# Patient Record
Sex: FEMALE | Race: OTHER | Employment: UNEMPLOYED | URBAN - METROPOLITAN AREA
[De-identification: names, ages, dates, MRNs, and addresses within clinical notes are randomized per-mention and may not be internally consistent; named-entity substitution may affect disease eponyms.]

---

## 2019-04-30 ENCOUNTER — OFFICE VISIT (OUTPATIENT)
Dept: FAMILY MEDICINE CLINIC | Age: 39
End: 2019-04-30

## 2019-04-30 VITALS
TEMPERATURE: 99.7 F | SYSTOLIC BLOOD PRESSURE: 106 MMHG | RESPIRATION RATE: 17 BRPM | HEART RATE: 74 BPM | DIASTOLIC BLOOD PRESSURE: 54 MMHG | WEIGHT: 138 LBS | OXYGEN SATURATION: 99 %

## 2019-04-30 DIAGNOSIS — Z00.00 ANNUAL PHYSICAL EXAM: Primary | ICD-10-CM

## 2019-04-30 DIAGNOSIS — Z00.00 ANNUAL PHYSICAL EXAM: ICD-10-CM

## 2019-04-30 DIAGNOSIS — M25.50 ARTHRALGIA, UNSPECIFIED JOINT: ICD-10-CM

## 2019-04-30 DIAGNOSIS — F32.A DEPRESSION, UNSPECIFIED DEPRESSION TYPE: ICD-10-CM

## 2019-04-30 DIAGNOSIS — R53.83 FATIGUE, UNSPECIFIED TYPE: ICD-10-CM

## 2019-04-30 DIAGNOSIS — R63.5 WEIGHT GAIN: ICD-10-CM

## 2019-04-30 NOTE — PROGRESS NOTES
Crysatl Cantu is a 45 y.o. female presents to office for establish care and headache. Back pain. Tired and lethargic. Depression Medication list has been reviewed with Crystal Cantu and updated as of today's date Health Maintenance items with a due date reviewed with patient: 
Health Maintenance Due Topic Date Due  
 DTaP/Tdap/Td series (1 - Tdap) 11/28/2001  PAP AKA CERVICAL CYTOLOGY  11/28/2001

## 2019-04-30 NOTE — PROGRESS NOTES
Teresa Coyne Chief Complaint Patient presents with [facility]  Back Pain  Lethargy  Depression Vitals:  
 19 1424 BP: 106/54 Pulse: 74 Resp: 17 Temp: 99.7 °F (37.6 °C) TempSrc: Oral  
SpO2: 99% Weight: 138 lb (62.6 kg) PainSc:   0 - No pain HPI:she is here for [facility] and she has few complains , multiple joint pain  and stifnes for many month ,  elbow hands and shoulders there is no significant swelling, Planing of fatigue tired,decreases energy as well as weight gain. She also has depressed mood and feels stressed out. Not suicidal or homicidal. 
 
 
History reviewed. No pertinent past medical history. Past Surgical History:  
Procedure Laterality Date  HX  SECTION    
 HX LIPOSUCTION    
 HX TUBAL LIGATION Social History Tobacco Use  Smoking status: Never Smoker  Smokeless tobacco: Never Used Substance Use Topics  Alcohol use: Never Frequency: Never History reviewed. No pertinent family history. Review of Systems Constitutional: Negative for chills, fever, malaise/fatigue and weight loss. HENT: Negative for congestion, ear discharge, ear pain, hearing loss, nosebleeds, sinus pain and sore throat. Eyes: Negative for blurred vision, double vision and discharge. Respiratory: Negative for cough, hemoptysis, sputum production and shortness of breath. Cardiovascular: Negative for chest pain, palpitations, claudication and leg swelling. Gastrointestinal: Negative for abdominal pain, constipation, diarrhea, nausea and vomiting. Genitourinary: Negative for dysuria, frequency and urgency. Musculoskeletal: Positive for back pain, joint pain and neck pain. Negative for myalgias. Skin: Negative for itching and rash. Neurological: Positive for tingling and headaches. Negative for dizziness, sensory change, speech change, focal weakness and weakness. Psychiatric/Behavioral: Positive for depression. Negative for hallucinations, substance abuse and suicidal ideas. The patient is nervous/anxious. The patient does not have insomnia. Physical Exam  
Constitutional: She is oriented to person, place, and time. She appears well-developed and well-nourished. No distress. HENT:  
Head: Normocephalic and atraumatic. Mouth/Throat: Oropharynx is clear and moist. No oropharyngeal exudate. Eyes: Pupils are equal, round, and reactive to light. Conjunctivae are normal. Right eye exhibits no discharge. Left eye exhibits no discharge. No scleral icterus. Neck: Normal range of motion. Neck supple. No thyromegaly present. Cardiovascular: Normal rate, regular rhythm and normal heart sounds. No murmur heard. Pulmonary/Chest: Effort normal and breath sounds normal. No respiratory distress. She has no wheezes. She has no rales. She exhibits no tenderness. Abdominal: Soft. Bowel sounds are normal. She exhibits no distension. There is no tenderness. There is no rebound. Musculoskeletal: Normal range of motion. She exhibits no edema, tenderness or deformity. Lymphadenopathy:  
  She has no cervical adenopathy. Neurological: She is alert and oriented to person, place, and time. No cranial nerve deficit. Coordination normal.  
Skin: Skin is warm and dry. No rash noted. She is not diaphoretic. No erythema. No pallor. Psychiatric: She has a normal mood and affect. Her behavior is normal. Judgment and thought content normal.  
Nursing note and vitals reviewed. Assessment and plan  
 
Plan of care has been discussed with the patient, he agrees to the plan and verbalized understanding. All his questions were answered More than 50% of the time spent in this visit was counseling the patient about  illness and treatment options 1. Arthralgia, unspecified joint 
 
- RHEUMATOID FACTOR, QL; Future - CRP, HIGH SENSITIVITY; Future - CBC WITH AUTOMATED DIFF; Future - VITAMIN D, 25 HYDROXY; Future This is not a 2. Fatigue, unspecified type 
 
- THYROID CASCADE PROFILE; Future 3. Depression, unspecified depression type Would like to wait for the results to come back and then consider possible treatment 
 
 
- THYROID CASCADE PROFILE; Future 4. Weight gain 
 
- THYROID CASCADE PROFILE; Future There are no active problems to display for this patient. No results found for this or any previous visit. No results found for any previous visit. Follow-up and Dispositions · Return in about 2 weeks (around 5/14/2019).

## 2019-05-02 NOTE — PROGRESS NOTES
Mario Xavier Chief Complaint Patient presents with [employer] [facility]  Back Pain  Lethargy  Depression Vitals:  
 19 1424 BP: 106/54 Pulse: 74 Resp: 17 Temp: 99.7 °F (37.6 °C) TempSrc: Oral  
SpO2: 99% Weight: 138 lb (62.6 kg) PainSc:   0 - No pain HPI: she is here for annual physical exam and blood work She is healthy not on any medication Does not smoke does not drink alcohol. She has complained that has been addressed in a different note History reviewed. No pertinent past medical history. Past Surgical History:  
Procedure Laterality Date  HX  SECTION    
 HX LIPOSUCTION    
 HX TUBAL LIGATION Social History Tobacco Use  Smoking status: Never Smoker  Smokeless tobacco: Never Used Substance Use Topics  Alcohol use: Never Frequency: Never History reviewed. No pertinent family history. Review of Systems Constitutional: Negative for chills, fever, malaise/fatigue and weight loss. HENT: Negative for congestion, ear discharge, ear pain, hearing loss and nosebleeds. Eyes: Negative for blurred vision, double vision and discharge. Respiratory: Negative for cough, hemoptysis, sputum production, shortness of breath and wheezing. Cardiovascular: Negative for chest pain, palpitations, claudication and leg swelling. Gastrointestinal: Negative for abdominal pain, constipation, diarrhea, nausea and vomiting. Genitourinary: Negative for dysuria, frequency and urgency. Musculoskeletal: Positive for joint pain. Negative for myalgias. Skin: Negative for itching and rash. Neurological: Negative for dizziness, tingling, sensory change, speech change, focal weakness, weakness and headaches. Psychiatric/Behavioral: Positive for depression and memory loss. Negative for hallucinations, substance abuse and suicidal ideas. The patient is nervous/anxious. The patient does not have insomnia. Physical Exam  
Constitutional: She is oriented to person, place, and time. She appears well-developed and well-nourished. No distress. HENT:  
Head: Normocephalic and atraumatic. Mouth/Throat: Oropharynx is clear and moist. No oropharyngeal exudate. Eyes: Pupils are equal, round, and reactive to light. Conjunctivae are normal. Right eye exhibits no discharge. Left eye exhibits no discharge. No scleral icterus. Neck: Normal range of motion. Neck supple. No thyromegaly present. Cardiovascular: Normal rate, regular rhythm and normal heart sounds. No murmur heard. Pulmonary/Chest: Effort normal and breath sounds normal. No respiratory distress. She has no wheezes. She has no rales. She exhibits no tenderness. Abdominal: Soft. She exhibits no distension. There is no tenderness. There is no rebound. There is a scar flap resection and umbilicus looks like created postsurgery Musculoskeletal: Normal range of motion. She exhibits tenderness. She exhibits no edema or deformity. Lymphadenopathy:  
  She has no cervical adenopathy. Neurological: She is alert and oriented to person, place, and time. No cranial nerve deficit. Coordination normal.  
Skin: Skin is warm and dry. No rash noted. She is not diaphoretic. No erythema. No pallor. Psychiatric: She has a normal mood and affect. Her behavior is normal. Judgment and thought content normal.  
Nursing note and vitals reviewed. Assessment and plan  
 
Plan of care has been discussed with the patient, he agrees to the plan and verbalized understanding. All his questions were answered More than 50% of the time spent in this visit was counseling the patient about  illness and treatment options Annual physical exam 
 
- LIPID PANEL; Future - METABOLIC PANEL, COMPREHENSIVE; Future - URINALYSIS W/MICROSCOPIC; Future There are no active problems to display for this patient. No results found for this or any previous visit. No results found for any previous visit. Follow-up and Dispositions · Return in about 2 weeks (around 5/14/2019).

## 2019-05-21 ENCOUNTER — OFFICE VISIT (OUTPATIENT)
Dept: FAMILY MEDICINE CLINIC | Age: 39
End: 2019-05-21

## 2019-05-21 VITALS
HEART RATE: 89 BPM | RESPIRATION RATE: 17 BRPM | HEIGHT: 59 IN | DIASTOLIC BLOOD PRESSURE: 75 MMHG | WEIGHT: 138 LBS | TEMPERATURE: 98 F | SYSTOLIC BLOOD PRESSURE: 125 MMHG | OXYGEN SATURATION: 99 % | BODY MASS INDEX: 27.82 KG/M2

## 2019-05-21 DIAGNOSIS — Z11.3 SCREEN FOR STD (SEXUALLY TRANSMITTED DISEASE): ICD-10-CM

## 2019-05-21 DIAGNOSIS — Z12.4 SCREENING FOR CERVICAL CANCER: Primary | ICD-10-CM

## 2019-05-21 NOTE — PROGRESS NOTES
Magi Argueta     Chief Complaint   Patient presents with    Well Woman     Vitals:    19 1550   BP: 125/75   Pulse: 89   Resp: 17   Temp: 98 °F (36.7 °C)   TempSrc: Oral   SpO2: 99%   Weight: 138 lb (62.6 kg)   Height: 4' 11\" (1.499 m)   PainSc:   0 - No pain         HPI: Patient is here to get pelvic examination and Pap smear. And breast exam    She has no acute complaints today she is scheduled to get carpal tunnel surgery, next week they    Blood work was all the last visit the patient will get it done today    No past medical history on file. Past Surgical History:   Procedure Laterality Date    HX  SECTION      HX LIPOSUCTION      HX TUBAL LIGATION       Social History     Tobacco Use    Smoking status: Never Smoker    Smokeless tobacco: Never Used   Substance Use Topics    Alcohol use: Never     Frequency: Never       No family history on file. Review of Systems   Constitutional: Negative for chills, fever, malaise/fatigue and weight loss. HENT: Negative for congestion, ear discharge, ear pain, hearing loss and nosebleeds. Eyes: Negative for blurred vision, double vision and discharge. Respiratory: Negative for cough. Cardiovascular: Negative for chest pain, palpitations, claudication and leg swelling. Gastrointestinal: Negative for abdominal pain, constipation, diarrhea, nausea and vomiting. Genitourinary: Negative for dysuria, frequency and urgency. Musculoskeletal: Positive for joint pain. Negative for myalgias. Skin: Negative for itching and rash. Neurological: Positive for tingling. Negative for dizziness, sensory change, speech change, focal weakness, weakness and headaches. Physical Exam   Constitutional: She is oriented to person, place, and time. She appears well-developed and well-nourished. No distress. HENT:   Head: Normocephalic and atraumatic. Mouth/Throat: Oropharynx is clear and moist. No oropharyngeal exudate.    Eyes: Pupils are equal, round, and reactive to light. Conjunctivae are normal. Right eye exhibits no discharge. Left eye exhibits no discharge. No scleral icterus. Neck: Normal range of motion. Neck supple. No thyromegaly present. Cardiovascular: Regular rhythm. Pulmonary/Chest: Effort normal and breath sounds normal. No respiratory distress. She has no wheezes. Abdominal: Soft. She exhibits no distension. There is no tenderness. There is no rebound. Musculoskeletal: Normal range of motion. She exhibits no edema or deformity. Lymphadenopathy:     She has no cervical adenopathy. Neurological: She is alert and oriented to person, place, and time. No cranial nerve deficit. Coordination normal.   Skin: Skin is warm and dry. No rash noted. She is not diaphoretic. No erythema. No pallor. Psychiatric: She has a normal mood and affect. Her behavior is normal. Judgment and thought content normal.   Nursing note and vitals reviewed. Assessment and plan     Plan of care has been discussed with the patient, he agrees to the plan and verbalized understanding. All his questions were answered  More than 50% of the time spent in this visit was counseling the patient about  illness and treatment options     Pap Smear Procedure     After obtaining verbal consent . and patient was placed in the lithectomy position   VULVA: normal appearing vulva with no masses, tenderness or lesions, VAGINA: normal appearing vagina with normal color and discharge, no lesions, CERVIX: normal    Pap smear sample  was obtained using silicon broom . appearing cervix without discharge or lesions, UTERUS: uterus is normal size, shape, consistency and nontender, ADNEXA: normal adnexa in size, nontender and no masses. Patient tolerated procedure well     Breasts: breasts appear normal, no suspicious masses, no skin or nipple changes or axillary nodes.         1. Screening for cervical cancer    - PAP IG, APTIMA HPV AND RFX 16/18,45 ([id number]); Future  - NUSWAB VAGINITIS PLUS; Future  - PAP IG, APTIMA HPV AND RFX 16/18,45 ([id number])  - NUSWAB VAGINITIS PLUS    2. Screen for STD (sexually transmitted disease)          There are no active problems to display for this patient. Results for orders placed or performed in visit on 04/30/19   THYROID CASCADE PROFILE   Result Value Ref Range    TSH 3.11 0.27 - 4.20 mcU/mL   RHEUMATOID FACTOR, QL   Result Value Ref Range    RHEUMATOID FACTOR QUANT, IMMUNOTURBIDIMETRIC <20 0 - 20 IU/mL   CRP, HIGH SENSITIVITY   Result Value Ref Range    C-Reactive Protein, Cardiac 0.66 mg/L   CBC WITH AUTOMATED DIFF   Result Value Ref Range    WBC 7.7 4.0 - 11.0 K/uL    RBC 4.37 3.80 - 5.20 M/uL    HGB 10.8 (L) 11.7 - 15.5 g/dL    HCT 35.5 35.1 - 46.5 %    MCV 81 80 - 95 fL    MCH 25 (L) 26 - 34 pg    MCHC 30 (L) 31 - 36 g/dL    RDW 15.4 10.0 - 15.5 %    PLATELET 265 177 - 181 K/uL    MPV 11.2 9.0 - 13.0 fL    NEUTROPHILS 64 40 - 75 %    Lymphocytes 28 20 - 45 %    MONOCYTES 6 3 - 12 %    EOSINOPHILS 2 0 - 6 %    BASOPHILS 1 0 - 2 %    ABS. NEUTROPHILS 4.9 1.8 - 7.7 K/uL    ABSOLUTE LYMPHOCYTE COUNT 2.1 1.0 - 4.8 K/uL    ABS. MONOCYTES 0.5 0.1 - 1.0 K/uL    ABS. EOSINOPHILS 0.1 0.0 - 0.5 K/uL    ABS. BASOPHILS 0.0 0.0 - 0.2 K/uL   VITAMIN D, 25 HYDROXY   Result Value Ref Range    VITAMIN D, 25-HYDROXY 18.8 (L) 32.0 - 100.0 ng/mL   LIPID PANEL   Result Value Ref Range    Triglyceride 168 (H) 40 - 149 mg/dL    HDL Cholesterol 46 40 - 59 mg/dL    Cholesterol, total 210 (H) 110 - 200 mg/dL    CHOLESTEROL/HDL 4.6 0.0 - 5.0    LDL, calculated 130 (H) 50 - 99 mg/dL    VLDL, calculated 34 (H) 8 - 30 mg/dL   METABOLIC PANEL, COMPREHENSIVE   Result Value Ref Range    Glucose 63 (L) 70 - 99 mg/dL    BUN 11 6 - 22 mg/dL    Creatinine 0.6 0.5 - 1.2 mg/dL    Sodium 140 133 - 145 mmol/L    Potassium 3.9 3.5 - 5.5 mmol/L    Chloride 100 98 - 110 mmol/L    CO2 24 20 - 32 mmol/L    AST (SGOT) 16 10 - 37 U/L    ALT (SGPT) 10 5 - 40 U/L    Alk. phosphatase 73 25 - 115 U/L    Bilirubin, total 0.1 (L) 0.2 - 1.2 mg/dL    Calcium 8.8 8.4 - 10.5 mg/dL    Protein, total 7.2 6.4 - 8.3 g/dL    Albumin 4.7 3.5 - 5.0 g/dL    A-G Ratio 1.9 1.1 - 2.6 ratio    Globulin 2.5 2.0 - 4.0 g/dL    Anion gap 16.0 mmol/L    GFRAA >60.0 >60.0    GFRNA >60.0 >60.0   URINALYSIS W/MICROSCOPIC   Result Value Ref Range    Specific Gravity 1.015 1.005 - 1.03    pH (UA) 7.0 5.0 - 8.0 pH    Protein Negative Negative, mg/dL    Glucose Negative Negative mg/dL    Ketone Negative Negative, mg/dL    Bilirubin Negative Negative    Blood Negative Negative    Nitrites Negative Negative    Leukocyte Esterase Negative Negative    Urobilinogen <2.0 <2.0 mg/dL    WBC Negative 0 - 2 /hpf    RBC 0-2 Negative, /hpf    Epithelial cells 5-10 (A) 0 - 2 /hpf    Amorphous Crystals PRESENT      Orders Only on 04/30/2019   Component Date Value Ref Range Status    TSH 05/21/2019 3.11  0.27 - 4.20 mcU/mL Final    Test includes TSH. T4 Free and T3 Free will be tested as needed.  RHEUMATOID FACTOR QUANT, IMMUNOTUR* 05/21/2019 <20  0 - 20 IU/mL Final    C-Reactive Protein, Cardiac 05/21/2019 0.66  mg/L Final    Comment: CARDIAC RISK ASSESSMENT  < 1.0 mg/L          Low Risk  1.0 - 3.0 mg/ L     Intermediate Risk  >3.0  mg/L          High Risk  A patient in the high risk group has a two-fold increase in relative risk for  cardiovascular disease compared to those in the low risk group. C-reactive protein is an acute phase reactant and may be elevated in many  conditions (bacterial infections, autoimmune diseases, cancer, surgery, etc). A  single test for hs-CRP may not reflect the patient basal level. Repeat   testing  in 1 month may be required if the levels are elevated initially. It is  recommended that other inflammatory conditions be excluded.       WBC 05/21/2019 7.7  4.0 - 11.0 K/uL Final    RBC 05/21/2019 4.37  3.80 - 5.20 M/uL Final    HGB 05/21/2019 10.8* 11.7 - 15.5 g/dL Final    HCT 05/21/2019 35.5  35.1 - 46.5 % Final    MCV 05/21/2019 81  80 - 95 fL Final    MCH 05/21/2019 25* 26 - 34 pg Final    MCHC 05/21/2019 30* 31 - 36 g/dL Final    RDW 05/21/2019 15.4  10.0 - 15.5 % Final    PLATELET 39/00/3047 040  140 - 440 K/uL Final    MPV 05/21/2019 11.2  9.0 - 13.0 fL Final    NEUTROPHILS 05/21/2019 64  40 - 75 % Final    Lymphocytes 05/21/2019 28  20 - 45 % Final    MONOCYTES 05/21/2019 6  3 - 12 % Final    EOSINOPHILS 05/21/2019 2  0 - 6 % Final    BASOPHILS 05/21/2019 1  0 - 2 % Final    ABS. NEUTROPHILS 05/21/2019 4.9  1.8 - 7.7 K/uL Final    ABSOLUTE LYMPHOCYTE COUNT 05/21/2019 2.1  1.0 - 4.8 K/uL Final    ABS. MONOCYTES 05/21/2019 0.5  0.1 - 1.0 K/uL Final    ABS. EOSINOPHILS 05/21/2019 0.1  0.0 - 0.5 K/uL Final    ABS. BASOPHILS 05/21/2019 0.0  0.0 - 0.2 K/uL Final    VITAMIN D, 25-HYDROXY 05/21/2019 18.8* 32.0 - 100.0 ng/mL Final    Triglyceride 05/21/2019 168* 40 - 149 mg/dL Final    HDL Cholesterol 05/21/2019 46  40 - 59 mg/dL Final    Cholesterol, total 05/21/2019 210* 110 - 200 mg/dL Final    CHOLESTEROL/HDL 05/21/2019 4.6  0.0 - 5.0 Final    LDL, calculated 05/21/2019 130* 50 - 99 mg/dL Final    VLDL, calculated 05/21/2019 34* 8 - 30 mg/dL Final    Comment: Test includes cholesterol, HDL cholesterol, triglycerides and LDL. Cholesterol Recommended NCEP guidelines in mg/dL:  Less than 200            Desirable  200 - 239                Borderline High  Greater than or  = 240   High  Please Note:  Total Chol/HDL Ratio                   Men     Women  1/2 Avg. Risk    3.4     3.3      Avg. Risk    5.0     4.4  2X  Avg. Risk    9.6     7.1  3X  Avg.  Risk   23.4    11.0      Glucose 05/21/2019 63* 70 - 99 mg/dL Final    BUN 05/21/2019 11  6 - 22 mg/dL Final    Creatinine 05/21/2019 0.6  0.5 - 1.2 mg/dL Final    Sodium 05/21/2019 140  133 - 145 mmol/L Final    Potassium 05/21/2019 3.9  3.5 - 5.5 mmol/L Final    Chloride 05/21/2019 100  98 - 110 mmol/L Final    CO2 05/21/2019 24  20 - 32 mmol/L Final    AST (SGOT) 05/21/2019 16  10 - 37 U/L Final    ALT (SGPT) 05/21/2019 10  5 - 40 U/L Final    Alk. phosphatase 05/21/2019 73  25 - 115 U/L Final    Bilirubin, total 05/21/2019 0.1* 0.2 - 1.2 mg/dL Final    Calcium 05/21/2019 8.8  8.4 - 10.5 mg/dL Final    Protein, total 05/21/2019 7.2  6.4 - 8.3 g/dL Final    Albumin 05/21/2019 4.7  3.5 - 5.0 g/dL Final    A-G Ratio 05/21/2019 1.9  1.1 - 2.6 ratio Final    Globulin 05/21/2019 2.5  2.0 - 4.0 g/dL Final    Anion gap 05/21/2019 16.0  mmol/L Final    Comment: Test includes Albumin, Alkaline Phosphatase, ALT, AST, BUN, Calcium, CO2,  Chloride, Creatinine, Glucose, Potassium, Sodium, Total Bilirubin and Total  Protein. Estimated GFR results are reported in mL/min/1.73 sq.m. by the MDRD equation. This eGFR is validated for stable chronic renal failure patients. This   equation  is unreliable in acute illness or patients with normal renal function.  GFRAA 05/21/2019 >60.0  >60.0 Final    GFRNA 05/21/2019 >60.0  >60.0 Final    Specific Gravity 05/21/2019 1.015  1.005 - 1.03 Final    pH (UA) 05/21/2019 7.0  5.0 - 8.0 pH Final    Protein 05/21/2019 Negative  Negative, mg/dL Final    Glucose 05/21/2019 Negative  Negative mg/dL Final    Ketone 05/21/2019 Negative  Negative, mg/dL Final    Bilirubin 05/21/2019 Negative  Negative Final    Blood 05/21/2019 Negative  Negative Final    Nitrites 05/21/2019 Negative  Negative Final    Leukocyte Esterase 05/21/2019 Negative  Negative Final    Urobilinogen 05/21/2019 <2.0  <2.0 mg/dL Final    WBC 05/21/2019 Negative  0 - 2 /hpf Final    RBC 05/21/2019 0-2  Negative, /hpf Final    Epithelial cells 05/21/2019 5-10* 0 - 2 /hpf Final    Amorphous Crystals 05/21/2019 PRESENT   Final          Follow-up and Dispositions    · Return for As per results.

## 2019-05-21 NOTE — PROGRESS NOTES
Magdalena Huizar is a 45 y.o. female presents to office for well woman    Medication list has been reviewed with Magdalena Huizar and updated as of today's date     Health Maintenance items with a due date reviewed with patient:  Health Maintenance Due   Topic Date Due    BREAST CANCER SCRN MAMMOGRAM  11/28/1998    DTaP/Tdap/Td series (1 - Tdap) 11/28/2001    PAP AKA CERVICAL CYTOLOGY  11/28/2001

## 2019-05-22 LAB
25(OH)D3 SERPL-MCNC: 18.8 NG/ML (ref 32–100)
A-G RATIO,AGRAT: 1.9 RATIO (ref 1.1–2.6)
ABSOLUTE LYMPHOCYTE COUNT, 10803: 2.1 K/UL (ref 1–4.8)
ALBUMIN SERPL-MCNC: 4.7 G/DL (ref 3.5–5)
ALP SERPL-CCNC: 73 U/L (ref 25–115)
ALT SERPL-CCNC: 10 U/L (ref 5–40)
AMORPHOUS CRYSTALS,AMORC: PRESENT
ANION GAP SERPL CALC-SCNC: 16 MMOL/L
AST SERPL W P-5'-P-CCNC: 16 U/L (ref 10–37)
BASOPHILS # BLD: 0 K/UL (ref 0–0.2)
BASOPHILS NFR BLD: 1 % (ref 0–2)
BILIRUB SERPL-MCNC: 0.1 MG/DL (ref 0.2–1.2)
BILIRUB UR QL: NEGATIVE
BUN SERPL-MCNC: 11 MG/DL (ref 6–22)
C-REACTIVE PROTEIN, CARDIAC, 120768: 0.66 MG/L
CALCIUM SERPL-MCNC: 8.8 MG/DL (ref 8.4–10.5)
CHLORIDE SERPL-SCNC: 100 MMOL/L (ref 98–110)
CHOLEST SERPL-MCNC: 210 MG/DL (ref 110–200)
CO2 SERPL-SCNC: 24 MMOL/L (ref 20–32)
CREAT SERPL-MCNC: 0.6 MG/DL (ref 0.5–1.2)
EOSINOPHIL # BLD: 0.1 K/UL (ref 0–0.5)
EOSINOPHIL NFR BLD: 2 % (ref 0–6)
EPITHELIAL,EPSU: ABNORMAL /HPF (ref 0–2)
ERYTHROCYTE [DISTWIDTH] IN BLOOD BY AUTOMATED COUNT: 15.4 % (ref 10–15.5)
GFRAA, 66117: >60
GFRNA, 66118: >60
GLOBULIN,GLOB: 2.5 G/DL (ref 2–4)
GLUCOSE SERPL-MCNC: 63 MG/DL (ref 70–99)
GLUCOSE UR QL: NEGATIVE MG/DL
GRANULOCYTES,GRANS: 64 % (ref 40–75)
HCT VFR BLD AUTO: 35.5 % (ref 35.1–46.5)
HDLC SERPL-MCNC: 4.6 MG/DL (ref 0–5)
HDLC SERPL-MCNC: 46 MG/DL (ref 40–59)
HGB BLD-MCNC: 10.8 G/DL (ref 11.7–15.5)
HGB UR QL STRIP: NEGATIVE
KETONES UR QL STRIP.AUTO: NEGATIVE MG/DL
LDLC SERPL CALC-MCNC: 130 MG/DL (ref 50–99)
LEUKOCYTE ESTERASE: NEGATIVE
LYMPHOCYTES, LYMLT: 28 % (ref 20–45)
MCH RBC QN AUTO: 25 PG (ref 26–34)
MCHC RBC AUTO-ENTMCNC: 30 G/DL (ref 31–36)
MCV RBC AUTO: 81 FL (ref 80–95)
MONOCYTES # BLD: 0.5 K/UL (ref 0.1–1)
MONOCYTES NFR BLD: 6 % (ref 3–12)
NEUTROPHILS # BLD AUTO: 4.9 K/UL (ref 1.8–7.7)
NITRITE UR QL STRIP.AUTO: NEGATIVE
PH UR STRIP: 7 PH (ref 5–8)
PLATELET # BLD AUTO: 263 K/UL (ref 140–440)
PMV BLD AUTO: 11.2 FL (ref 9–13)
POTASSIUM SERPL-SCNC: 3.9 MMOL/L (ref 3.5–5.5)
PROT SERPL-MCNC: 7.2 G/DL (ref 6.4–8.3)
PROT UR QL STRIP: NEGATIVE MG/DL
RBC # BLD AUTO: 4.37 M/UL (ref 3.8–5.2)
RBC #/AREA URNS HPF: ABNORMAL /HPF
RHEUMATOID FACTOR QUANT, IMMUNOTURBIDIMETRIC: <20 IU/ML (ref 0–20)
SODIUM SERPL-SCNC: 140 MMOL/L (ref 133–145)
SP GR UR: 1.01 (ref 1–1.03)
TRIGL SERPL-MCNC: 168 MG/DL (ref 40–149)
TSH SERPL-ACNC: 3.11 MCU/ML (ref 0.27–4.2)
UROBILINOGEN UR STRIP-MCNC: <2 MG/DL
VLDLC SERPL CALC-MCNC: 34 MG/DL (ref 8–30)
WBC # BLD AUTO: 7.7 K/UL (ref 4–11)
WBC URNS QL MICRO: NEGATIVE /HPF (ref 0–2)

## 2019-05-24 LAB
BACTERIAL VAGINOSIS, NAA: NEGATIVE
CANDIDA ALBICANS, NAA, 180056: NEGATIVE
CANDIDA GLABRATA, NAA, 180057: NEGATIVE
CANDIDA KRUSEI, NAA, 180016: NEGATIVE
CHLAMYDIA TRACHOMATIS, NAA, 180097: NEGATIVE
HPV H RFLX, 13184: NEGATIVE
NEISSERIA GONORRHOEAE, NAA, 180104: NEGATIVE
PAP IMAGE GUIDED, 8900296: NORMAL
TRICH VAG BY NAA, 180087: NEGATIVE

## 2019-05-27 NOTE — PROGRESS NOTES
Low vitamin D level to be started on 50.000 units weekly for 6 month then reevaluate    Thyroid function is within normal limits    Low hemoglobin patient has iron deficiency, is patient seen gastroenterology and had endoscopy? ?     Normal thyroid function   Rheumatoid factor is negative

## 2020-07-15 ENCOUNTER — OFFICE VISIT (OUTPATIENT)
Dept: ORTHOPEDIC SURGERY | Facility: CLINIC | Age: 40
End: 2020-07-15

## 2020-07-15 VITALS
BODY MASS INDEX: 27.7 KG/M2 | HEART RATE: 80 BPM | DIASTOLIC BLOOD PRESSURE: 76 MMHG | SYSTOLIC BLOOD PRESSURE: 108 MMHG | WEIGHT: 137.4 LBS | HEIGHT: 59 IN | TEMPERATURE: 96.9 F

## 2020-07-15 DIAGNOSIS — G56.02 LEFT CARPAL TUNNEL SYNDROME: Primary | ICD-10-CM

## 2020-07-15 NOTE — PROGRESS NOTES
Lavelle Latif is a 44 y.o. female right handed Unknown employment. Worker's Compensation and legal considerations: not known. Vitals:    07/15/20 1310   BP: 108/76   Pulse: 80   Temp: 96.9 °F (36.1 °C)   Weight: 137 lb 6.4 oz (62.3 kg)   Height: 4' 11\" (1.499 m)   PainSc:   8   PainLoc: Hand           Chief Complaint   Patient presents with    Hand Pain     Lt         HPI: Patient comes in today with complaints of left hand pain and numbness. She has a history of bilateral carpal tunnel syndrome with release of her carpal tunnel on the right. She thinks she may have had an EMG done in the past however she is not sure where or when this was. Date of onset: Undetermined    Injury: No    Prior Treatment:  No    Numbness/ Tingling: Yes: Comment: Left hand radial digits      ROS: Review of Systems - General ROS: negative  Psychological ROS: negative  ENT ROS: negative  Allergy and Immunology ROS: negative  Hematological and Lymphatic ROS: negative  Respiratory ROS: no cough, shortness of breath, or wheezing  Cardiovascular ROS: no chest pain or dyspnea on exertion  Gastrointestinal ROS: no abdominal pain, change in bowel habits, or black or bloody stools  Musculoskeletal ROS: positive for - pain in hand - left  Neurological ROS: positive for - numbness/tingling  Dermatological ROS: negative    History reviewed. No pertinent past medical history. Past Surgical History:   Procedure Laterality Date    HX  SECTION      HX LIPOSUCTION      HX TUBAL LIGATION      HX WRIST FRACTURE TX         Current Outpatient Medications   Medication Sig Dispense Refill    triamcinolone acetonide (KENALOG) 10 mg/mL injection 1 mL by Intra artICUlar route once for 1 dose. 1 Vial 0       No Known Allergies        PE:     Physical Exam       NEUROVASCULAR    Examination L R Examination L R   Carpal Comp. + - Pronator Comp.  - -   Carpal Tinel + - Pronator Tinel - -   Phalen's + - Pronator Stress - -   Cubital Comp. - - Guyon Comp. - -   Cubital Tinel - - Guyon Tinel - -   Elbow Hyperflexion - - Adson's - -   Spurling's - - SC Comp. - -   PCB Median abn - - SC Tinel - -   Radial Tinel - - IC Comp. - -   Digital Tinel - - IC Tinel - -   Radial 2-Pt WNL WNL Ulnar 2-Pt WNL WNL     Radial Pulse: 2+  Capillary Refill: < 2 sec  Santo: Not Performed  Digital Santo: Not Performed      Imaging:     None indicated        ICD-10-CM ICD-9-CM    1. Left carpal tunnel syndrome  G56.02 354.0 AMB SUPPLY ORDER      TRIAMCINOLONE ACETONIDE INJ      triamcinolone acetonide (KENALOG) 10 mg/mL injection      INJECT CARPAL TUNNEL      EMG ONE EXTREMITY UPPER LT      NCV/LAT MOTOR PER NERVE UP/LT         Plan:     Carpal tunnel injection, brace and upper extremity EMG    Follow-up and Dispositions    · Return for EMG Review. Plan was reviewed with patient, who verbalized agreement and understanding of the plan    [address] NOTE        Chart reviewed for the following:   Guanakito LUCIO DO, have reviewed the History, Physical and updated the Allergic reactions for [employer]     TIME OUT performed immediately prior to start of procedure:   Guanakito LUCIO DO, have performed the following reviews on [facility] prior to the start of the procedure:            * Patient was identified by name and date of birth   * Agreement on procedure being performed was verified  * Risks and Benefits explained to the patient  * Procedure site verified and marked as necessary  * Patient was positioned for comfort  * Consent was signed and verified     Time: 14:05      Date of procedure: 7/15/2020    Procedure performed by:   Lorraine Causey DO    Provider assisted by: Chuy Melchor LPN    Patient assisted by: self    How tolerated by patient: tolerated the procedure well with no complications    Post Procedural Pain Scale: 0 - No Hurt    Comments: none    Procedure:  After consent was obtained, using sterile technique the carpal tunnel was prepped. Local anesthetic used: 1% lidocaine. Kenalog 5 mg and was then injected and the needle withdrawn. The procedure was well tolerated. The patient is asked to continue to rest the area for a few more days before resuming regular activities. It may be more painful for the first 1-2 days. Watch for fever, or increased swelling or persistent pain in the joint. Call or return to clinic prn if such symptoms occur or there is failure to improve as anticipated.

## 2020-11-25 ENCOUNTER — OFFICE VISIT (OUTPATIENT)
Dept: ORTHOPEDIC SURGERY | Age: 40
End: 2020-11-25
Payer: COMMERCIAL

## 2020-11-25 DIAGNOSIS — R20.0 NUMBNESS AND TINGLING IN LEFT HAND: Primary | ICD-10-CM

## 2020-11-25 DIAGNOSIS — R20.2 NUMBNESS AND TINGLING IN LEFT HAND: ICD-10-CM

## 2020-11-25 DIAGNOSIS — R94.131 ABNORMAL EMG: ICD-10-CM

## 2020-11-25 DIAGNOSIS — R20.2 NUMBNESS AND TINGLING IN LEFT HAND: Primary | ICD-10-CM

## 2020-11-25 DIAGNOSIS — R20.0 NUMBNESS AND TINGLING IN LEFT HAND: ICD-10-CM

## 2020-11-25 PROCEDURE — 95886 MUSC TEST DONE W/N TEST COMP: CPT | Performed by: PHYSICAL MEDICINE & REHABILITATION

## 2020-11-25 PROCEDURE — 95909 NRV CNDJ TST 5-6 STUDIES: CPT | Performed by: PHYSICAL MEDICINE & REHABILITATION

## 2020-11-25 NOTE — PROGRESS NOTES
[address]  [address], [address]  [address]  Phone: [phone]  Fax: [phone]        Julieta Pozo  : 1980  PCP: Jeannie Ball MD  2020    ELECTROMYOGRAPHY AND NERVE CONDUCTION STUDIES    Chino Bolton was referred by Dr. Daylin Castaneda for electrodiagnostic evaluation of left hand numbness and tingling. NCV & EMG Findings:  Evaluation of the left median motor nerve showed prolonged distal onset latency (4.8 ms). The left median sensory nerve showed prolonged distal peak latency (3.9 ms) and decreased conduction velocity (Wrist-2nd Digit, 36 m/s). All remaining nerves (as indicated in the following tables) were within normal limits. All examined muscles (as indicated in the following table) showed no evidence of electrical instability. INTERPRETATION  This is an abnormal electrodiagnostic examination. These findings may be consistent with:   1. Moderate median mononeuropathy at the left wrist (carpal tunnel syndrome)    There is no electrodiagnostic evidence of any cervical radiculopathy, brachial plexopathy, peripheral polyneuropathy, or any other mononeuropathy. CLINICAL INTERPRETATION  Her electrodiagnostic finding of carpal tunnel syndrome may be consistent with her left arm symptoms. HISTORY OF PRESENT ILLNESS  Chino Bolton is a 44 y.o. female. Pt presents today for LUE EMG evaluation of left hand pain and numbness. She found benefit from a left carpal tunnel injection. She has h/o CTR on the R. PAST MEDICAL HISTORY   No past medical history on file. Past Surgical History:   Procedure Laterality Date    HX  SECTION      HX LIPOSUCTION      HX TUBAL LIGATION      HX WRIST FRACTURE TX     .       MEDICATIONS         ALLERGIES  No Known Allergies       SOCIAL HISTORY    Social History     Socioeconomic History    Marital status:      Spouse name: Not on file    Number of children: Not on file    Years of education: Not on file    Highest education level: Not on file   Tobacco Use    Smoking status: Never Smoker    Smokeless tobacco: Never Used   Substance and Sexual Activity    Alcohol use: Never     Frequency: Never    Drug use: Never    Sexual activity: Yes     Partners: Male       FAMILY HISTORY  No family history on file. PHYSICAL EXAMINATION  There were no vitals taken for this visit. Pain Assessment  7/15/2020   Location of Pain Hand   Pain Location Comment Symptoms started in hand and have since progressed to forearm & shoulder   Location Modifiers Left   Severity of Pain 8   Quality of Pain Sharp; Other (Comment)   Quality of Pain Comment numbness & tingle, pressure   Duration of Pain Persistent   Frequency of Pain Constant   Aggravating Factors Other (Comment)   Aggravating Factors Comment sleeping on Lt side   Limiting Behavior Yes           Constitutional:  Well developed, well nourished, in no acute distress. Psychiatric: Affect and mood are appropriate. Integumentary: No rashes or abrasions noted on exposed areas. SPINE/MUSCULOSKELETAL EXAM    On brief examination: None.       NCV & EMG Findings:  Nerve Conduction Studies  Anti Sensory Summary Table     Stim Site NR Peak (ms) Norm Peak (ms) O-P Amp (µV) Norm O-P Amp Site1 Site2 Delta-P (ms) Dist (cm) Ishaan (m/s) Norm Ishaan (m/s)   Left Median Anti Sensory (2nd Digit)   Wrist    3.9 <3.6 102.2 >10 Wrist 2nd Digit 3.9 14.0 36 >39   Left Radial Anti Sensory (Base 1st Digit)   Wrist    2.0 <3.1 79.8  Wrist Base 1st Digit 2.0 0.0     Left Ulnar Anti Sensory (5th Digit)   Wrist    3.2 <3.7 51.8 >15.0 Wrist 5th Digit 3.2 14.0 44 >38     Motor Summary Table     Stim Site NR Onset (ms) Norm Onset (ms) O-P Amp (mV) Norm O-P Amp Site1 Site2 Delta-0 (ms) Dist (cm) Ishaan (m/s) Norm Ishaan (m/s)   Left Median Motor (Abd Poll Brev)   Wrist    4.8 <4.2 7.8 >5 Elbow Wrist 3.3 18.0 55 >50   Elbow 8.1  7.2          Left Ulnar Motor (Abd Dig Min)   Wrist    2.8 <4.2 14.2 >3 B Elbow Wrist 2.5 15.0 60 >53   B Elbow    5.3  14.0  A Elbow B Elbow 1.9 10.0 53 >53   A Elbow    7.2  13.7            EMG     Side Muscle Nerve Root Ins Act Fibs Psw Amp Dur Poly Recrt Int Geraldean Hippo Comment   Left Biceps Musculocut C5-6 Nml Nml Nml Nml Nml 0 Nml Nml    Left Triceps Radial C6-7-8 Nml Nml Nml Nml Nml 0 Nml Nml    Left PronatorTeres Median C6-7 Nml Nml Nml Nml Nml 0 Nml Nml    Left Abd Poll Brev Median C8-T1 Nml Nml Nml Nml Nml 0 Nml Nml    Left 1stDorInt Ulnar C8-T1 Nml Nml Nml Nml Nml 0 Nml Nml        Nerve Conduction Studies  Anti Sensory Left/Right Comparison     Stim Site L Lat (ms) R Lat (ms) L-R Lat (ms) L Amp (µV) R Amp (µV) L-R Amp (%) Site1 Site2 L Ishaan (m/s) R Ishaan (m/s) L-R Ishaan (m/s)   Median Anti Sensory (2nd Digit)   Wrist 3.9   102.2   Wrist 2nd Digit 36     Radial Anti Sensory (Base 1st Digit)   Wrist 2.0   79.8   Wrist Base 1st Digit      Ulnar Anti Sensory (5th Digit)   Wrist 3.2   51.8   Wrist 5th Digit 44       Motor Left/Right Comparison     Stim Site L Lat (ms) R Lat (ms) L-R Lat (ms) L Amp (mV) R Amp (mV) L-R Amp (%) Site1 Site2 L Ishaan (m/s) R Ishaan (m/s) L-R Ishaan (m/s)   Median Motor (Abd Poll Brev)   Wrist 4.8   7.8   Elbow Wrist 55     Elbow 8.1   7.2          Ulnar Motor (Abd Dig Min)   Wrist 2.8   14.2   B Elbow Wrist 60     B Elbow 5.3   14.0   A Elbow B Elbow 53     A Elbow 7.2   13.7                Waveforms:                     [facility]  OFFICE PROCEDURE PROGRESS NOTE        Chart reviewed for the following:   IKina, have reviewed the History, Physical and updated the Allergic reactions for [employer]     TIME OUT performed immediately prior to start of procedure:   I, Kina Jorgensen, have performed the following reviews on Adele LugoHinojosa prior to the start of the procedure:            * Patient was identified by name and date of birth   * Agreement on procedure being performed was verified  * Risks and Benefits explained to the patient  * Procedure site verified and marked as necessary  * Patient was positioned for comfort  * Consent was signed and verified     Time: 10:02 AM    Date of procedure: 11/25/2020    Procedure performed by:  Gamal Nguyen MD    Provider accompanied by: Russel. Patient accompanied by: Self.     How tolerated by patient: tolerated the procedure well with no complications    Post Procedural Pain Scale: 0 - No Hurt    Comments: none    Written by Jc Lee, [address] as dictated by Kwame Xiao MD

## 2020-12-09 ENCOUNTER — OFFICE VISIT (OUTPATIENT)
Dept: ORTHOPEDIC SURGERY | Age: 40
End: 2020-12-09
Payer: COMMERCIAL

## 2020-12-09 VITALS
HEIGHT: 59 IN | OXYGEN SATURATION: 100 % | TEMPERATURE: 97.2 F | WEIGHT: 142.2 LBS | RESPIRATION RATE: 16 BRPM | SYSTOLIC BLOOD PRESSURE: 96 MMHG | DIASTOLIC BLOOD PRESSURE: 62 MMHG | BODY MASS INDEX: 28.67 KG/M2 | HEART RATE: 72 BPM

## 2020-12-09 DIAGNOSIS — G56.02 LEFT CARPAL TUNNEL SYNDROME: Primary | ICD-10-CM

## 2020-12-09 PROCEDURE — 99214 OFFICE O/P EST MOD 30 MIN: CPT | Performed by: ORTHOPAEDIC SURGERY

## 2020-12-09 NOTE — PROGRESS NOTES
Chino Bolton is a 36 y.o. female right handed Unknown employment. Worker's Compensation and legal considerations: not known. Vitals:    20 1045   BP: 96/62   Pulse: 72   Resp: 16   Temp: 97.2 °F (36.2 °C)   TempSrc: Temporal   SpO2: 100%   Weight: 142 lb 3.2 oz (64.5 kg)   Height: 4' 11\" (1.499 m)   PainSc:   2   PainLoc: Knee           Chief Complaint   Patient presents with    Hand Pain     right hand pain       HPI: Patient comes in today for follow-up of her right upper extremity EMG. She previously had an injection for carpal tunnel that did help her symptoms however they are starting to return. She would like to set up surgery today. Initial HPI: Patient comes in today with complaints of left hand pain and numbness. She has a history of bilateral carpal tunnel syndrome with release of her carpal tunnel on the right. She thinks she may have had an EMG done in the past however she is not sure where or when this was. Date of onset: Undetermined    Injury: No    Prior Treatment:  Yes: Comment: Right carpal tunnel injection    Numbness/ Tingling: Yes: Comment: Left hand radial digits      ROS: Review of Systems - General ROS: negative  Psychological ROS: negative  ENT ROS: negative  Allergy and Immunology ROS: negative  Hematological and Lymphatic ROS: negative  Respiratory ROS: no cough, shortness of breath, or wheezing  Cardiovascular ROS: no chest pain or dyspnea on exertion  Gastrointestinal ROS: no abdominal pain, change in bowel habits, or black or bloody stools  Musculoskeletal ROS: positive for - pain in hand - left  Neurological ROS: positive for - numbness/tingling  Dermatological ROS: negative    History reviewed. No pertinent past medical history.     Past Surgical History:   Procedure Laterality Date    HX  SECTION      HX LIPOSUCTION      HX TUBAL LIGATION      HX WRIST FRACTURE TX             No Known Allergies        PE:     Physical Exam  Vitals signs and nursing note reviewed. Constitutional:       General: She is not in acute distress. Appearance: Normal appearance. She is not ill-appearing. Eyes:      Extraocular Movements: Extraocular movements intact. Pupils: Pupils are equal, round, and reactive to light. Neck:      Musculoskeletal: Normal range of motion and neck supple. Cardiovascular:      Pulses: Normal pulses. Pulmonary:      Effort: Pulmonary effort is normal. No respiratory distress. Abdominal:      General: Abdomen is flat. There is no distension. Musculoskeletal: Normal range of motion. General: No swelling, tenderness, deformity or signs of injury. Right lower leg: No edema. Left lower leg: No edema. Skin:     General: Skin is warm and dry. Capillary Refill: Capillary refill takes less than 2 seconds. Findings: No bruising or erythema. Neurological:      General: No focal deficit present. Mental Status: She is alert and oriented to person, place, and time. Cranial Nerves: No cranial nerve deficit. Sensory: No sensory deficit. Psychiatric:         Mood and Affect: Mood normal.         Behavior: Behavior normal.            NEUROVASCULAR    Examination L R Examination L R   Carpal Comp. + - Pronator Comp. - -   Carpal Tinel + - Pronator Tinel - -   Phalen's + - Pronator Stress - -   Cubital Comp. - - Guyon Comp. - -   Cubital Tinel - - Guyon Tinel - -   Elbow Hyperflexion - - Adson's - -   Spurling's - - SC Comp. - -   PCB Median abn - - SC Tinel - -   Radial Tinel - - IC Comp. - -   Digital Tinel - - IC Tinel - -   Radial 2-Pt WNL WNL Ulnar 2-Pt WNL WNL     Radial Pulse: 2+  Capillary Refill: < 2 sec  [place]: Not Performed  [employer]: Not Performed      NCV & EMG Findings:  Evaluation of the left median motor nerve showed prolonged distal onset latency (4.8 ms).   The left median sensory nerve showed prolonged distal peak latency (3.9 ms) and decreased conduction velocity (Wrist-2nd Digit, 36 m/s). All remaining nerves (as indicated in the following tables) were within normal limits.       All examined muscles (as indicated in the following table) showed no evidence of electrical instability.       INTERPRETATION  This is an abnormal electrodiagnostic examination. These findings may be consistent with:   1. Moderate median mononeuropathy at the left wrist (carpal tunnel syndrome)     There is no electrodiagnostic evidence of any cervical radiculopathy, brachial plexopathy, peripheral polyneuropathy, or any other mononeuropathy.     CLINICAL INTERPRETATION  Her electrodiagnostic finding of carpal tunnel syndrome may be consistent with her left arm symptoms. Imaging:     None indicated        ICD-10-CM ICD-9-CM    1. Left carpal tunnel syndrome  G56.02 354.0 SCHEDULE SURGERY         Plan:     Schedule left carpal tunnel release    Follow-up and Dispositions    · Return for h&p, consent.           Plan was reviewed with patient, who verbalized agreement and understanding of the plan

## 2021-01-08 NOTE — PROGRESS NOTES
[phone] PATIENT NUMBER -------------------------------- [facility] NURSE LINE (IF QUESTIONS ONLY - [phone]) FROM: [facility]ES PATIENT TRIAGED FOR NUMBNESS AND TINGLING TO BOTH HANDS, AND HEADACHE, PATIENT DECLINED ER, PATIENT'S NAME, KIT FISHER,  1961, CALL BACK NUMBER [phone]. [facility]     Normal Pap smear with negative HPV and negative vaginal swabs repeat Pap smear in 3 years

## 2021-01-19 DIAGNOSIS — Z01.818 PREOP EXAMINATION: Primary | ICD-10-CM

## 2021-01-20 ENCOUNTER — OFFICE VISIT (OUTPATIENT)
Dept: ORTHOPEDIC SURGERY | Age: 41
End: 2021-01-20

## 2021-01-20 VITALS
TEMPERATURE: 97 F | HEIGHT: 59 IN | RESPIRATION RATE: 16 BRPM | HEART RATE: 67 BPM | OXYGEN SATURATION: 100 % | DIASTOLIC BLOOD PRESSURE: 68 MMHG | SYSTOLIC BLOOD PRESSURE: 118 MMHG | WEIGHT: 138 LBS | BODY MASS INDEX: 27.82 KG/M2

## 2021-01-20 DIAGNOSIS — G56.02 LEFT CARPAL TUNNEL SYNDROME: Primary | ICD-10-CM

## 2021-01-20 DIAGNOSIS — Z01.818 PREOP EXAMINATION: ICD-10-CM

## 2021-01-20 RX ORDER — LINACLOTIDE 290 UG/1
CAPSULE, GELATIN COATED ORAL
COMMUNITY
Start: 2020-10-13

## 2021-01-20 NOTE — PROGRESS NOTES
Morteza Velásquez is a 36 y.o. female right handed Unknown employment. Worker's Compensation and legal considerations: not known. Vitals:    01/20/21 1300   BP: 118/68   Pulse: 67   Resp: 16   Temp: 97 °F (36.1 °C)   TempSrc: Temporal   SpO2: 100%   Weight: 138 lb (62.6 kg)   Height: 4' 11\" (1.499 m)   PainSc:   0 - No pain   PainLoc: Wrist           Chief Complaint   Patient presents with    Wrist Pain     left wrist pain       HPI: Patient presents today for preoperative history and physical as she is scheduled for a left carpal tunnel release. She denies any changes since her last visit. 12/9/2020 HPI: Patient comes in today for follow-up of her right upper extremity EMG. She previously had an injection for carpal tunnel that did help her symptoms however they are starting to return. She would like to set up surgery today. Initial HPI: Patient comes in today with complaints of left hand pain and numbness. She has a history of bilateral carpal tunnel syndrome with release of her carpal tunnel on the right. She thinks she may have had an EMG done in the past however she is not sure where or when this was.     Date of onset: Undetermined    Injury: No    Prior Treatment:  Yes: Comment: Right carpal tunnel injection    Numbness/ Tingling: Yes: Comment: Left hand radial digits      ROS: Review of Systems - General ROS: negative  Psychological ROS: negative  ENT ROS: negative  Allergy and Immunology ROS: negative  Hematological and Lymphatic ROS: negative  Respiratory ROS: no cough, shortness of breath, or wheezing  Cardiovascular ROS: no chest pain or dyspnea on exertion  Gastrointestinal ROS: no abdominal pain, change in bowel habits, or black or bloody stools  Musculoskeletal ROS: positive for - pain in hand - left  Neurological ROS: positive for - numbness/tingling  Dermatological ROS: negative    Past Medical History:   Diagnosis Date    IBS (irritable bowel syndrome)        Past Surgical History:   Procedure Laterality Date    HX  SECTION      HX LIPOSUCTION      HX TUBAL LIGATION      HX WRIST FRACTURE TX             No Known Allergies        PE:     Physical Exam  Vitals signs and nursing note reviewed. Constitutional:       General: She is not in acute distress. Appearance: Normal appearance. She is not ill-appearing, toxic-appearing or diaphoretic. HENT:      Head: Normocephalic and atraumatic. Nose: Nose normal.      Mouth/Throat:      Mouth: Mucous membranes are moist.   Eyes:      Extraocular Movements: Extraocular movements intact. Pupils: Pupils are equal, round, and reactive to light. Neck:      Musculoskeletal: Normal range of motion and neck supple. Cardiovascular:      Pulses: Normal pulses. Pulmonary:      Effort: Pulmonary effort is normal. No respiratory distress. Abdominal:      General: Abdomen is flat. There is no distension. Musculoskeletal: Normal range of motion. General: No swelling, tenderness, deformity or signs of injury. Right lower leg: No edema. Left lower leg: No edema. Skin:     General: Skin is warm and dry. Capillary Refill: Capillary refill takes less than 2 seconds. Findings: No bruising or erythema. Neurological:      General: No focal deficit present. Mental Status: She is alert and oriented to person, place, and time. Cranial Nerves: No cranial nerve deficit. Sensory: No sensory deficit. Psychiatric:         Mood and Affect: Mood normal.         Behavior: Behavior normal.            NEUROVASCULAR    Examination L R Examination L R   Carpal Comp. + - Pronator Comp. - -   Carpal Tinel + - Pronator Tinel - -   Phalen's + - Pronator Stress - -   Cubital Comp. - - Guyon Comp. - -   Cubital Tinel - - Guyon Tinel - -   Elbow Hyperflexion - - Adson's - -   Spurling's - - SC Comp. - -   PCB Median abn - - SC Tinel - -   Radial Tinel - - IC Comp.  - -   Digital Tinel - - IC Tinel - - Radial 2-Pt WNL WNL Ulnar 2-Pt WNL WNL     Radial Pulse: 2+  Capillary Refill: < 2 sec  [place]: Not Performed  [employer]: Not Performed      NCV & EMG Findings:  Evaluation of the left median motor nerve showed prolonged distal onset latency (4.8 ms). The left median sensory nerve showed prolonged distal peak latency (3.9 ms) and decreased conduction velocity (Wrist-2nd Digit, 36 m/s). All remaining nerves (as indicated in the following tables) were within normal limits.       All examined muscles (as indicated in the following table) showed no evidence of electrical instability.       INTERPRETATION  This is an abnormal electrodiagnostic examination. These findings may be consistent with:   1. Moderate median mononeuropathy at the left wrist (carpal tunnel syndrome)     There is no electrodiagnostic evidence of any cervical radiculopathy, brachial plexopathy, peripheral polyneuropathy, or any other mononeuropathy.     CLINICAL INTERPRETATION  Her electrodiagnostic finding of carpal tunnel syndrome may be consistent with her left arm symptoms. Imaging:     None indicated        ICD-10-CM ICD-9-CM    1. Left carpal tunnel syndrome  G56.02 354.0          Plan:     Proceed as scheduled for left carpal tunnel release    Follow-up and Dispositions    · Return for 2 weeks postop.           Plan was reviewed with patient, who verbalized agreement and understanding of the plan

## 2021-01-21 ENCOUNTER — HOSPITAL ENCOUNTER (OUTPATIENT)
Dept: PREADMISSION TESTING | Age: 41
Discharge: HOME | End: 2021-01-21
Payer: COMMERCIAL

## 2021-01-21 PROCEDURE — U0003 INFECTIOUS AGENT DETECTION BY NUCLEIC ACID (DNA OR RNA); SEVERE ACUTE RESPIRATORY SYNDROME CORONAVIRUS 2 (SARS-COV-2) (CORONAVIRUS DISEASE [COVID-19]), AMPLIFIED PROBE TECHNIQUE, MAKING USE OF HIGH THROUGHPUT TECHNOLOGIES AS DESCRIBED BY CMS-2020-01-R: HCPCS

## 2021-01-22 LAB — SARS-COV-2, COV2NT: NOT DETECTED

## 2021-01-25 ENCOUNTER — ANESTHESIA EVENT (OUTPATIENT)
Dept: SURGERY | Age: 41
End: 2021-01-25
Payer: MEDICAID

## 2021-01-25 NOTE — H&P
Chiqui Nettles is a 36 y.o. female right handed Unknown employment. Worker's Compensation and legal considerations: not known.         Vitals:     01/20/21 1300   BP: 118/68   Pulse: 67   Resp: 16   Temp: 97 °F (36.1 °C)   TempSrc: Temporal   SpO2: 100%   Weight: 138 lb (62.6 kg)   Height: 4' 11\" (1.499 m)   PainSc:   0 - No pain   PainLoc: Wrist                    Chief Complaint   Patient presents with    Wrist Pain       left wrist pain         HPI: Patient presents today for preoperative history and physical as she is scheduled for a left carpal tunnel release. She denies any changes since her last visit.     12/9/2020 HPI: Patient comes in today for follow-up of her right upper extremity EMG. She previously had an injection for carpal tunnel that did help her symptoms however they are starting to return. She would like to set up surgery today.     Initial HPI: Patient comes in today with complaints of left hand pain and numbness. She has a history of bilateral carpal tunnel syndrome with release of her carpal tunnel on the right.   She thinks she may have had an EMG done in the past however she is not sure where or when this was.     Date of onset: Undetermined     Injury: No     Prior Treatment:  Yes: Comment: Right carpal tunnel injection     Numbness/ Tingling: Yes: Comment: Left hand radial digits        ROS: Review of Systems - General ROS: negative  Psychological ROS: negative  ENT ROS: negative  Allergy and Immunology ROS: negative  Hematological and Lymphatic ROS: negative  Respiratory ROS: no cough, shortness of breath, or wheezing  Cardiovascular ROS: no chest pain or dyspnea on exertion  Gastrointestinal ROS: no abdominal pain, change in bowel habits, or black or bloody stools  Musculoskeletal ROS: positive for - pain in hand - left  Neurological ROS: positive for - numbness/tingling  Dermatological ROS: negative          Past Medical History:   Diagnosis Date    IBS (irritable bowel syndrome)           Surgical History         Past Surgical History:   Procedure Laterality Date    HX  SECTION        HX LIPOSUCTION        HX TUBAL LIGATION        HX WRIST FRACTURE TX                      No Known Allergies           PE:      Physical Exam  Vitals signs and nursing note reviewed. Constitutional:       General: She is not in acute distress. Appearance: Normal appearance. She is not ill-appearing, toxic-appearing or diaphoretic. HENT:      Head: Normocephalic and atraumatic. Nose: Nose normal.      Mouth/Throat:      Mouth: Mucous membranes are moist.   Eyes:      Extraocular Movements: Extraocular movements intact. Pupils: Pupils are equal, round, and reactive to light. Neck:      Musculoskeletal: Normal range of motion and neck supple. Cardiovascular:      Pulses: Normal pulses. Pulmonary:      Effort: Pulmonary effort is normal. No respiratory distress. Abdominal:      General: Abdomen is flat. There is no distension. Musculoskeletal: Normal range of motion. General: No swelling, tenderness, deformity or signs of injury. Right lower leg: No edema. Left lower leg: No edema. Skin:     General: Skin is warm and dry. Capillary Refill: Capillary refill takes less than 2 seconds. Findings: No bruising or erythema. Neurological:      General: No focal deficit present. Mental Status: She is alert and oriented to person, place, and time. Cranial Nerves: No cranial nerve deficit. Sensory: No sensory deficit. Psychiatric:         Mood and Affect: Mood normal.         Behavior: Behavior normal.               NEUROVASCULAR     Examination L R Examination L R   Carpal Comp. + - Pronator Comp. - -   Carpal Tinel + - Pronator Tinel - -   Phalen's + - Pronator Stress - -   Cubital Comp. - - Guyon Comp. - -   Cubital Tinel - - Guyon Tinel - -   Elbow Hyperflexion - - Adson's - -   Spurling's - - SC Comp.  - -   PCB Median abn - - SC Tinel - -   Radial Tinel - - IC Comp. - -   Digital Tinel - - IC Tinel - -   Radial 2-Pt WNL WNL Ulnar 2-Pt WNL WNL      Radial Pulse: 2+  Capillary Refill: < 2 sec  Santo: Not Performed  Digital Santo: Not Performed        NCV & EMG Findings:  Evaluation of the left median motor nerve showed prolonged distal onset latency (4.8 ms).  The left median sensory nerve showed prolonged distal peak latency (3.9 ms) and decreased conduction velocity (Wrist-2nd Digit, 36 m/s).  All remaining nerves (as indicated in the following tables) were within normal limits.       All examined muscles (as indicated in the following table) showed no evidence of electrical instability.       INTERPRETATION  This is an abnormal electrodiagnostic examination. These findings may be consistent with:   1. Moderate median mononeuropathy at the left wrist (carpal tunnel syndrome)     There is no electrodiagnostic evidence of any cervical radiculopathy, brachial plexopathy, peripheral polyneuropathy, or any other mononeuropathy.     CLINICAL INTERPRETATION  Her electrodiagnostic finding of carpal tunnel syndrome may be consistent with her left arm symptoms.      Imaging:      None indicated            ICD-10-CM ICD-9-CM     1. Left carpal tunnel syndrome  G56.02 354. 0              Plan:      Proceed as scheduled for left carpal tunnel release    This procedure has been fully reviewed with the patient and written informed consent has been obtained. The patient was counseled at length about the risks of soheila Covid-19 during their perioperative period and any recovery window from their procedure. The patient was made aware that soheila Covid-19  may worsen their prognosis for recovering from their procedure and lend to a higher morbidity and/or mortality risk. All material risks, benefits, and reasonable alternatives including postponing the procedure were discussed.  The patient does  wish to proceed with the procedure at this time.       Follow-up and Dispositions    · Return for 2 weeks postop.            Plan was reviewed with patient, who verbalized agreement and understanding of the plan

## 2021-01-26 ENCOUNTER — ANESTHESIA (OUTPATIENT)
Dept: SURGERY | Age: 41
End: 2021-01-26
Payer: MEDICAID

## 2021-01-26 ENCOUNTER — HOSPITAL ENCOUNTER (OUTPATIENT)
Age: 41
Setting detail: OUTPATIENT SURGERY
Discharge: HOME | End: 2021-01-26
Attending: ORTHOPAEDIC SURGERY | Admitting: ORTHOPAEDIC SURGERY
Payer: MEDICAID

## 2021-01-26 VITALS
RESPIRATION RATE: 16 BRPM | BODY MASS INDEX: 28.02 KG/M2 | SYSTOLIC BLOOD PRESSURE: 102 MMHG | OXYGEN SATURATION: 97 % | TEMPERATURE: 97.4 F | HEIGHT: 59 IN | WEIGHT: 139 LBS | HEART RATE: 77 BPM | DIASTOLIC BLOOD PRESSURE: 69 MMHG

## 2021-01-26 DIAGNOSIS — Z98.890 S/P CARPAL TUNNEL RELEASE: ICD-10-CM

## 2021-01-26 DIAGNOSIS — G56.02 LEFT CARPAL TUNNEL SYNDROME: Primary | ICD-10-CM

## 2021-01-26 LAB — HCG UR QL: NEGATIVE

## 2021-01-26 PROCEDURE — 77030040356 HC CORD BPLR FRCP COVD -A: Performed by: ORTHOPAEDIC SURGERY

## 2021-01-26 PROCEDURE — 76210000063 HC OR PH I REC FIRST 0.5 HR: Performed by: ORTHOPAEDIC SURGERY

## 2021-01-26 PROCEDURE — 77030006689 HC BLD OPHTH BVR BD -A: Performed by: ORTHOPAEDIC SURGERY

## 2021-01-26 PROCEDURE — 77030040922 HC BLNKT HYPOTHRM STRY -A: Performed by: ORTHOPAEDIC SURGERY

## 2021-01-26 PROCEDURE — 77030040361 HC SLV COMPR DVT MDII -B: Performed by: ORTHOPAEDIC SURGERY

## 2021-01-26 PROCEDURE — 74011250636 HC RX REV CODE- 250/636: Performed by: NURSE ANESTHETIST, CERTIFIED REGISTERED

## 2021-01-26 PROCEDURE — 2709999900 HC NON-CHARGEABLE SUPPLY: Performed by: ORTHOPAEDIC SURGERY

## 2021-01-26 PROCEDURE — 76210000021 HC REC RM PH II 0.5 TO 1 HR: Performed by: ORTHOPAEDIC SURGERY

## 2021-01-26 PROCEDURE — 74011000250 HC RX REV CODE- 250: Performed by: NURSE ANESTHETIST, CERTIFIED REGISTERED

## 2021-01-26 PROCEDURE — 74011000250 HC RX REV CODE- 250: Performed by: ORTHOPAEDIC SURGERY

## 2021-01-26 PROCEDURE — 74011250637 HC RX REV CODE- 250/637: Performed by: NURSE ANESTHETIST, CERTIFIED REGISTERED

## 2021-01-26 PROCEDURE — 01810 ANES PX NRV MUSC F/ARM WRST: CPT | Performed by: NURSE ANESTHETIST, CERTIFIED REGISTERED

## 2021-01-26 PROCEDURE — 64721 CARPAL TUNNEL SURGERY: CPT | Performed by: ORTHOPAEDIC SURGERY

## 2021-01-26 PROCEDURE — 77030010813: Performed by: ORTHOPAEDIC SURGERY

## 2021-01-26 PROCEDURE — 76060000032 HC ANESTHESIA 0.5 TO 1 HR: Performed by: ORTHOPAEDIC SURGERY

## 2021-01-26 PROCEDURE — 74011250636 HC RX REV CODE- 250/636: Performed by: ORTHOPAEDIC SURGERY

## 2021-01-26 PROCEDURE — 81025 URINE PREGNANCY TEST: CPT

## 2021-01-26 PROCEDURE — 01810 ANES PX NRV MUSC F/ARM WRST: CPT | Performed by: ANESTHESIOLOGY

## 2021-01-26 PROCEDURE — 76010000138 HC OR TIME 0.5 TO 1 HR: Performed by: ORTHOPAEDIC SURGERY

## 2021-01-26 RX ORDER — FENTANYL CITRATE 50 UG/ML
INJECTION, SOLUTION INTRAMUSCULAR; INTRAVENOUS AS NEEDED
Status: DISCONTINUED | OUTPATIENT
Start: 2021-01-26 | End: 2021-01-26 | Stop reason: HOSPADM

## 2021-01-26 RX ORDER — ONDANSETRON 2 MG/ML
INJECTION INTRAMUSCULAR; INTRAVENOUS AS NEEDED
Status: DISCONTINUED | OUTPATIENT
Start: 2021-01-26 | End: 2021-01-26 | Stop reason: HOSPADM

## 2021-01-26 RX ORDER — SODIUM CHLORIDE 0.9 % (FLUSH) 0.9 %
5-40 SYRINGE (ML) INJECTION EVERY 8 HOURS
Status: DISCONTINUED | OUTPATIENT
Start: 2021-01-26 | End: 2021-01-26 | Stop reason: HOSPADM

## 2021-01-26 RX ORDER — FENTANYL CITRATE 50 UG/ML
50 INJECTION, SOLUTION INTRAMUSCULAR; INTRAVENOUS AS NEEDED
Status: DISCONTINUED | OUTPATIENT
Start: 2021-01-26 | End: 2021-01-26 | Stop reason: HOSPADM

## 2021-01-26 RX ORDER — BUPIVACAINE HYDROCHLORIDE 2.5 MG/ML
INJECTION, SOLUTION EPIDURAL; INFILTRATION; INTRACAUDAL AS NEEDED
Status: DISCONTINUED | OUTPATIENT
Start: 2021-01-26 | End: 2021-01-26 | Stop reason: HOSPADM

## 2021-01-26 RX ORDER — SODIUM CHLORIDE 0.9 % (FLUSH) 0.9 %
5-40 SYRINGE (ML) INJECTION AS NEEDED
Status: DISCONTINUED | OUTPATIENT
Start: 2021-01-26 | End: 2021-01-26 | Stop reason: HOSPADM

## 2021-01-26 RX ORDER — SODIUM CHLORIDE, SODIUM LACTATE, POTASSIUM CHLORIDE, CALCIUM CHLORIDE 600; 310; 30; 20 MG/100ML; MG/100ML; MG/100ML; MG/100ML
25 INJECTION, SOLUTION INTRAVENOUS CONTINUOUS
Status: DISCONTINUED | OUTPATIENT
Start: 2021-01-26 | End: 2021-01-26 | Stop reason: HOSPADM

## 2021-01-26 RX ORDER — ONDANSETRON 2 MG/ML
4 INJECTION INTRAMUSCULAR; INTRAVENOUS ONCE
Status: DISCONTINUED | OUTPATIENT
Start: 2021-01-26 | End: 2021-01-26 | Stop reason: HOSPADM

## 2021-01-26 RX ORDER — LIDOCAINE HYDROCHLORIDE 20 MG/ML
INJECTION, SOLUTION EPIDURAL; INFILTRATION; INTRACAUDAL; PERINEURAL AS NEEDED
Status: DISCONTINUED | OUTPATIENT
Start: 2021-01-26 | End: 2021-01-26 | Stop reason: HOSPADM

## 2021-01-26 RX ORDER — PROPOFOL 10 MG/ML
VIAL (ML) INTRAVENOUS
Status: DISCONTINUED | OUTPATIENT
Start: 2021-01-26 | End: 2021-01-26 | Stop reason: HOSPADM

## 2021-01-26 RX ORDER — FAMOTIDINE 20 MG/1
20 TABLET, FILM COATED ORAL ONCE
Status: COMPLETED | OUTPATIENT
Start: 2021-01-26 | End: 2021-01-26

## 2021-01-26 RX ORDER — HYDROCODONE BITARTRATE AND ACETAMINOPHEN 5; 325 MG/1; MG/1
1 TABLET ORAL
Qty: 12 TAB | Refills: 0 | Status: SHIPPED | OUTPATIENT
Start: 2021-01-26 | End: 2021-01-29

## 2021-01-26 RX ORDER — MIDAZOLAM HYDROCHLORIDE 1 MG/ML
INJECTION, SOLUTION INTRAMUSCULAR; INTRAVENOUS AS NEEDED
Status: DISCONTINUED | OUTPATIENT
Start: 2021-01-26 | End: 2021-01-26 | Stop reason: HOSPADM

## 2021-01-26 RX ORDER — SODIUM CHLORIDE, SODIUM LACTATE, POTASSIUM CHLORIDE, CALCIUM CHLORIDE 600; 310; 30; 20 MG/100ML; MG/100ML; MG/100ML; MG/100ML
50 INJECTION, SOLUTION INTRAVENOUS CONTINUOUS
Status: DISCONTINUED | OUTPATIENT
Start: 2021-01-26 | End: 2021-01-26 | Stop reason: HOSPADM

## 2021-01-26 RX ORDER — SODIUM CHLORIDE, SODIUM LACTATE, POTASSIUM CHLORIDE, CALCIUM CHLORIDE 600; 310; 30; 20 MG/100ML; MG/100ML; MG/100ML; MG/100ML
INJECTION, SOLUTION INTRAVENOUS
Status: DISCONTINUED | OUTPATIENT
Start: 2021-01-26 | End: 2021-01-26 | Stop reason: HOSPADM

## 2021-01-26 RX ORDER — NALOXONE HYDROCHLORIDE 0.4 MG/ML
0.1 INJECTION, SOLUTION INTRAMUSCULAR; INTRAVENOUS; SUBCUTANEOUS AS NEEDED
Status: DISCONTINUED | OUTPATIENT
Start: 2021-01-26 | End: 2021-01-26 | Stop reason: HOSPADM

## 2021-01-26 RX ORDER — CEFAZOLIN SODIUM 2 G/50ML
2 SOLUTION INTRAVENOUS ONCE
Status: COMPLETED | OUTPATIENT
Start: 2021-01-26 | End: 2021-01-26

## 2021-01-26 RX ORDER — DIPHENHYDRAMINE HYDROCHLORIDE 50 MG/ML
12.5 INJECTION, SOLUTION INTRAMUSCULAR; INTRAVENOUS
Status: DISCONTINUED | OUTPATIENT
Start: 2021-01-26 | End: 2021-01-26 | Stop reason: HOSPADM

## 2021-01-26 RX ADMIN — ONDANSETRON 4 MG: 2 INJECTION INTRAMUSCULAR; INTRAVENOUS at 08:40

## 2021-01-26 RX ADMIN — SODIUM CHLORIDE, SODIUM LACTATE, POTASSIUM CHLORIDE, AND CALCIUM CHLORIDE 25 ML/HR: 600; 310; 30; 20 INJECTION, SOLUTION INTRAVENOUS at 08:05

## 2021-01-26 RX ADMIN — LIDOCAINE HYDROCHLORIDE 40 MG: 20 INJECTION, SOLUTION EPIDURAL; INFILTRATION; INTRACAUDAL; PERINEURAL at 08:24

## 2021-01-26 RX ADMIN — SODIUM CHLORIDE, SODIUM LACTATE, POTASSIUM CHLORIDE, AND CALCIUM CHLORIDE: 600; 310; 30; 20 INJECTION, SOLUTION INTRAVENOUS at 08:18

## 2021-01-26 RX ADMIN — PROPOFOL 50 MCG/KG/MIN: 10 INJECTION, EMULSION INTRAVENOUS at 08:24

## 2021-01-26 RX ADMIN — FAMOTIDINE 20 MG: 20 TABLET, FILM COATED ORAL at 08:05

## 2021-01-26 RX ADMIN — FENTANYL CITRATE 100 MCG: 50 INJECTION, SOLUTION INTRAMUSCULAR; INTRAVENOUS at 08:23

## 2021-01-26 RX ADMIN — CEFAZOLIN SODIUM 2 G: 2 SOLUTION INTRAVENOUS at 08:29

## 2021-01-26 RX ADMIN — MIDAZOLAM HYDROCHLORIDE 2 MG: 2 INJECTION, SOLUTION INTRAMUSCULAR; INTRAVENOUS at 08:18

## 2021-01-26 NOTE — OP NOTES
Operative Report    Patient: Jose Carlos Koehler MRN: [id number]  SSN: [id number]    YOB: 1980  Age: 36 y.o. Sex: female       Date of Surgery: 1/26/2021     Preoperative Diagnosis: Left carpal tunnel syndrome [G56.02]     Postoperative Diagnosis: Left carpal tunnel syndrome [G56.02     Surgeon(s) and Role:     Vick Montes, DO - Primary    Assistant GUERA Christianson    Anesthesia: MAC and local    Procedure: Procedure(s):  LEFT CARPAL TUNNEL RELEASE 13892    Findings: Flattened median nerve    Procedure in Detail:     Indications for procedure of and outlined in the perioperative documentation most notably being refractory to conservative treatment. Informed consent was obtained from the patient. The risks and benefits of the procedure were discussed the patient. They include but are not limited to neurovascular injury, tendon injury, blood loss, infection, incomplete release of nerve, incomplete relief of symptoms, need for further surgery, chronic stiffness, chronic pain, complications from anesthesia including death, and the possibility of soheila Covid. After informed consent was obtained, the patient was taken back to the operative suite. A tourniquet was applied to the operative extremity and 10 mL of quarter percent Marcaine plain was injected into the carpal tunnel as well as the surrounding subcutaneous tissues. The upper extremity was prepped and draped in the normal sterile fashion. The arm was exsanguinated the tourniquet was elevated to 250 mmHg. An incision was made over the carpal tunnel in line with the radial border of the fourth ray. The subcutaneous tissues were dissected and electrocautery was used for hemostasis. The palmar fascia was incised in line with the incision. The transverse carpal ligament was identified after raising any palmaris brevis off of the ligament.   The ligament was incised centrally and attention was then turned distally were the ligament was incised up to the level of the fat pad where the motor branch was visualized. Attention was then turned proximally where the remainder of the palmar fascia as well as transverse carpal ligament was incised. The ligament was incised up to the level of but not including the antebrachial fascia. The wound was copiously irrigated and closed in interrupted fashion. The patient was placed into a sterile dressing. The patient was sent to recovery in stable condition given appropriate wound care instructions follow-up with me in the outpatient setting and a prescription for pain medicine. Estimated Blood Loss: Minimal    Tourniquet Time:   Total Tourniquet Time Documented:  Upper Arm (Left) - 13 minutes  Total: Upper Arm (Left) - 13 minutes        Implants: * No implants in log *            Specimens: * No specimens in log *        Drains: None                Complications: None    Counts: Sponge and needle counts were correct times two.     Signed By:  Crystal Herzog DO     January 26, 2021

## 2021-01-26 NOTE — DISCHARGE INSTRUCTIONS
Ice and elevate hand. Keep dressing clean and dry and cover with plastic bag for showering. Do not remove dressing. Move fingers into fist every hour while awake. DISCHARGE SUMMARY from Nurse  PATIENT INSTRUCTIONS:  After general anesthesia or intravenous sedation, for 24 hours or while taking prescription Narcotics:  · Limit your activities  · Do not drive and operate hazardous machinery  · Do not make important personal or business decisions  · Do  not drink alcoholic beverages  · If you have not urinated within 8 hours after discharge, please contact your surgeon on call. Report the following to your surgeon:  · Excessive pain, swelling, redness or odor of or around the surgical area  · Temperature over 100.5  · Nausea and vomiting lasting longer than 4 hours or if unable to take medications  · Any signs of decreased circulation or nerve impairment to extremity: change in color, persistent  numbness, tingling, coldness or increase pain  · Any questions    What to do at Home:  Recommended activity: Activity as tolerated and no driving for today. *  Please give a list of your current medications to your Primary Care Provider. *  Please update this list whenever your medications are discontinued, doses are      changed, or new medications (including over-the-counter products) are added. *  Please carry medication information at all times in case of emergency situations. These are general instructions for a healthy lifestyle:    No smoking/ No tobacco products/ Avoid exposure to second hand smoke  Surgeon General's Warning:  Quitting smoking now greatly reduces serious risk to your health.     Obesity, smoking, and sedentary lifestyle greatly increases your risk for illness    A healthy diet, regular physical exercise & weight monitoring are important for maintaining a healthy lifestyle    You may be retaining fluid if you have a history of heart failure or if you experience any of the following symptoms:  Weight gain of 3 pounds or more overnight or 5 pounds in a week, increased swelling in our hands or feet or shortness of breath while lying flat in bed. Please call your doctor as soon as you notice any of these symptoms; do not wait until your next office visit. The discharge information has been reviewed with the patient. The patient verbalized understanding. Discharge medications reviewed with the patient and appropriate educational materials and side effects teaching were provided. ___________________________________________________________________________________________________________________________________    Patient Education        Carpal Tunnel Release: What to Expect at [facility] tunnel reduces the pressure on a nerve in the wrist. Your doctor cut a ligament that presses on the nerve. This lets the nerve pass freely through the tunnel without being squeezed. Your hand will hurt and may feel weak with some numbness. This usually goes away in a few days, but it may take several months. Your doctor may remove the large bandage, or he or she will tell you when and how to remove it yourself. In some cases, you may have a splint. If you have one, you will wear it for about 2 weeks. Your doctor will take out your stitches in 1 to 2 weeks. Your hand and wrist may feel worse than they used to feel. But the pain should start to go away. It usually takes 3 to 4 months to recover and up to 1 year before hand strength returns. How much strength returns will vary. The timing of your return to work depends on the type of surgery you had, whether the surgery was on your dominant hand (the hand you use most), and your work activities. If you had open surgery on your dominant hand and you do repeated actions at work, you may be able to go back to work in 10 to 8 weeks. Repeated motions include typing or assembly-line work.  If the surgery was on the other hand and you don't do repeated actions at work, you may be able to return to work in 9 to 14 days. If you had endoscopic surgery, you may be able to go back to work sooner than with open surgery. This care sheet gives you a general idea about how long it will take for you to recover. But each person recovers at a different pace. Follow the steps below to get better as quickly as possible. How can you care for yourself at home? Activity    · Rest when you feel tired. Getting enough sleep will help you recover.     · Try to walk each day. Start by walking a little more than you did the day before. Bit by bit, increase the amount you walk.     · For up to 2 weeks after surgery, avoid lifting things heavier than 1 to 2 pounds and using your hand. This includes doing repeated arm or hand movements, such as typing or using a computer mouse, washing windows, vacuuming, or chopping food. Do not use power tools, and avoid activities that cause vibration.     · You may do heavier tasks about 4 weeks after surgery. These include vacuuming, mowing the lawn, and gardening.     · You may shower 24 to 48 hours after surgery, if your doctor okays it. Keep your bandage dry by taping a sheet of plastic to cover it. If you have a splint, keep it dry. Your doctor will tell you if you can remove it when you shower. Be careful not to put the splint on too tight. Do not take a bath until the incision heals, or until your doctor tells you it is okay.     · You may drive when you are fully able to use your hand. Diet    · You can eat your normal diet. If your stomach is upset, try bland, low-fat foods like plain rice, broiled chicken, toast, and yogurt. Medicines    · Your doctor will tell you if and when you can restart your medicines. He or she will also give you instructions about taking any new medicines.     · If you take aspirin or some other blood thinner, ask your doctor if and when to start taking it again.  Make sure that you understand exactly what your doctor wants you to do.     · Take pain medicines exactly as directed. ? If the doctor gave you a prescription medicine for pain, take it as prescribed. ? If you are not taking a prescription pain medicine, take an over-the-counter medicine such as acetaminophen (Tylenol), ibuprofen (Advil, Motrin), or naproxen (Aleve). Read and follow all instructions on the label. ? Do not take two or more pain medicines at the same time unless the doctor told you to. Many pain medicines have acetaminophen, which is Tylenol. Too much acetaminophen (Tylenol) can be harmful.     · If you think your pain medicine is making you sick to your stomach:  ? Take your medicine after meals (unless your doctor has told you not to). ? Ask your doctor for a different pain medicine.     · If your doctor prescribed antibiotics, take them as directed. Do not stop taking them just because you feel better. You need to take the full course of antibiotics. Incision and splint care    · Keep your bandage dry. If it gets dirty, you may change it.     · If you have a splint, talk to your doctor about when you should wear it. Exercise    · You may need wrist and hand rehabilitation. This is a series of exercises you do after your surgery. This helps you get back your wrist's and hand's range of motion, strength, and . You will work with your doctor and physical or occupational therapist to plan this exercise program. To get the best results, you need to do the exercises correctly and as often and as long as your doctor tells you. Ice and elevation    · Put ice or a cold pack on your wrist for 10 to 20 minutes at a time. Try to do this every 1 to 2 hours for the next 3 days (when you are awake) or until the swelling goes down. Put a thin cloth between the ice and your skin.     · Prop up the sore wrist on a pillow when you ice it or anytime you sit or lie down during the next 3 days.  Try to keep it above the level of your heart. This will help reduce swelling. Other instructions    · Avoid letting your hand hang down. This can cause swelling. Follow-up care is a key part of your treatment and safety. Be sure to make and go to all appointments, and call your doctor if you are having problems. It's also a good idea to know your test results and keep a list of the medicines you take. When should you call for help? Call 911 anytime you think you may need emergency care. For example, call if:    · You passed out (lost consciousness).     · You have chest pain, are short of breath, or cough up blood. Call your doctor now or seek immediate medical care if:    · You have pain that does not get better after you take pain medicine.     · Your hand is cool or pale or changes color.     · Your cast or splint feels too tight.     · You have tingling, weakness, or numbness in your hand or fingers.     · You are sick to your stomach or cannot drink fluids.     · You have loose stitches, or your incision comes open.     · You have signs of a blood clot in your leg (called a deep vein thrombosis), such as:  ? Pain in your calf, back of the knee, thigh, or groin. ? Redness or swelling in your leg.     · You have signs of infection, such as:  ? Increased pain, swelling, warmth, or redness. ? Red streaks leading from the incision. ? Pus draining from the incision. ? A fever.     · Bright red blood has soaked through the bandage over your incision. Watch closely for any changes in your health, and be sure to contact your doctor if:    · You have a problem with your cast or splint.     · You do not get better as expected. Where can you learn more? Go to http://www.gray.com/  Enter N388 in the search box to learn more about \"Carpal Tunnel Release: What to Expect at Home. \"  Current as of: March 2, 2020               Content Version: 12.6  © 9790-8795 [employer].    Care instructions adapted under license by [address] (which disclaims liability or warranty for this information). If you have questions about a medical condition or this instruction, always ask your healthcare professional. [address] any warranty or liability for your use of this information.

## 2021-01-26 NOTE — ANESTHESIA PREPROCEDURE EVALUATION
Relevant Problems   No relevant active problems       Anesthetic History   No history of anesthetic complications            Review of Systems / Medical History  Patient summary reviewed and pertinent labs reviewed    Pulmonary  Within defined limits                 Neuro/Psych   Within defined limits           Cardiovascular  Within defined limits                Exercise tolerance: >4 METS     GI/Hepatic/Renal                Endo/Other        Arthritis     Other Findings              Physical Exam    Airway  Mallampati: II  TM Distance: 4 - 6 cm  Neck ROM: normal range of motion   Mouth opening: Normal     Cardiovascular    Rhythm: regular  Rate: normal         Dental  No notable dental hx       Pulmonary  Breath sounds clear to auscultation               Abdominal  GI exam deferred       Other Findings            Anesthetic Plan    ASA: 2  Anesthesia type: MAC          Induction: Intravenous  Anesthetic plan and risks discussed with: Patient

## 2021-01-26 NOTE — H&P
Update History & Physical    The Patient's History and Physical of January 20, 2021 was reviewed with the patient and I examined the patient. There was no change. The surgical site was confirmed by the patient and me. Plan:  The risk, benefits, expected outcome, and alternative to the recommended procedure have been discussed with the patient. Patient understands and wants to proceed with the procedure.     Electronically signed by Jessie Urias DO on 1/26/2021 at 8:09 AM

## 2021-01-26 NOTE — ANESTHESIA POSTPROCEDURE EVALUATION
Procedure(s):  LEFT CARPAL TUNNEL RELEASE. MAC    Anesthesia Post Evaluation      Multimodal analgesia: multimodal analgesia used between 6 hours prior to anesthesia start to PACU discharge  Patient location during evaluation: bedside  Patient participation: complete - patient participated  Level of consciousness: awake  Pain management: adequate  Airway patency: patent  Anesthetic complications: no  Cardiovascular status: stable  Respiratory status: acceptable  Hydration status: acceptable  Post anesthesia nausea and vomiting:  controlled      INITIAL Post-op Vital signs:   Vitals Value Taken Time   /68 01/26/21 0912   Temp 36.7 °C (98.1 °F) 01/26/21 0856   Pulse 78 01/26/21 0917   Resp 15 01/26/21 0917   SpO2 96 % 01/26/21 0917   Vitals shown include unvalidated device data.

## 2021-01-26 NOTE — BRIEF OP NOTE
Brief Postoperative Note    Patient: Keny De La Torre  YOB: 1980  MRN: [id number]    Date of Procedure: 1/26/2021     Pre-Op Diagnosis: Left carpal tunnel syndrome [G56.02]    Post-Op Diagnosis: Same as preoperative diagnosis. Procedure(s):  LEFT CARPAL TUNNEL RELEASE    Surgeon(s):   Tc Hinojosa DO    Surgical Assistant: Surg Asst-1: Sherin HUERTA    Anesthesia: MAC     Estimated Blood Loss (mL): Minimal    Complications: None    Specimens: * No specimens in log *     Implants: * No implants in log *    Drains: * No LDAs found *    Findings: flattened median nerve    Electronically Signed by Aniyah Cai DO on 1/26/2021 at 12:35 PM

## 2021-02-08 ENCOUNTER — OFFICE VISIT (OUTPATIENT)
Dept: ORTHOPEDIC SURGERY | Age: 41
End: 2021-02-08
Payer: COMMERCIAL

## 2021-02-08 VITALS
DIASTOLIC BLOOD PRESSURE: 73 MMHG | WEIGHT: 140.8 LBS | SYSTOLIC BLOOD PRESSURE: 112 MMHG | RESPIRATION RATE: 16 BRPM | OXYGEN SATURATION: 100 % | TEMPERATURE: 97.3 F | HEART RATE: 70 BPM | HEIGHT: 59 IN | BODY MASS INDEX: 28.39 KG/M2

## 2021-02-08 DIAGNOSIS — G56.01 RIGHT CARPAL TUNNEL SYNDROME: ICD-10-CM

## 2021-02-08 DIAGNOSIS — M54.2 NECK PAIN: ICD-10-CM

## 2021-02-08 DIAGNOSIS — Z98.890 S/P BILATERAL CARPAL TUNNEL RELEASE: ICD-10-CM

## 2021-02-08 DIAGNOSIS — G56.02 LEFT CARPAL TUNNEL SYNDROME: Primary | ICD-10-CM

## 2021-02-08 PROCEDURE — 99024 POSTOP FOLLOW-UP VISIT: CPT | Performed by: ORTHOPAEDIC SURGERY

## 2021-02-08 NOTE — LETTER
NOTIFICATION RETURN TO WORK / SCHOOL 
 
2/8/2021 1:28 PM 
 
Ms. Chrystal Lennon [address] [facility] [address] To Whom It May Concern: 
 
Chrystal Lennon is currently under the care of [address] [place] [address]. Patient was seen in our office today, she may return to work full duty with no restrictions, on 03/08/2021. If there are questions or concerns please have the patient contact our office. Sincerely, Chip Siemens, DO

## 2021-02-08 NOTE — PROGRESS NOTES
Caitlyn Sprain is a 36 y.o. female right handed Unknown employment. Worker's Compensation and legal considerations: not known. Vitals:    02/08/21 1308   BP: 112/73   Pulse: 70   Resp: 16   Temp: 97.3 °F (36.3 °C)   TempSrc: Temporal   SpO2: 100%   Weight: 140 lb 12.8 oz (63.9 kg)   Height: 4' 11\" (1.499 m)   PainSc:   1   PainLoc: Wrist   LMP: 01/21/2021           Chief Complaint   Patient presents with    Wrist Pain     left wrist pain       HPI: Patient presents today 2 weeks status post left carpal tunnel release. She reports minimal pain and no numbness on the left side. She does report some fatigue on the right side of which she is 2 to 3 years status post carpal tunnel release. She also reports a history of chronic neck and low back pain. Preop HPI: Patient presents today for preoperative history and physical as she is scheduled for a left carpal tunnel release. She denies any changes since her last visit. 12/9/2020 HPI: Patient comes in today for follow-up of her right upper extremity EMG. She previously had an injection for carpal tunnel that did help her symptoms however they are starting to return. She would like to set up surgery today. Initial HPI: Patient comes in today with complaints of left hand pain and numbness. She has a history of bilateral carpal tunnel syndrome with release of her carpal tunnel on the right. She thinks she may have had an EMG done in the past however she is not sure where or when this was.     Date of onset: Undetermined    Injury: No    Prior Treatment:  Yes: Comment: Bilateral carpal tunnel releases    Numbness/ Tingling: Yes: Comment: Left hand radial digits      ROS: Review of Systems - General ROS: negative  Psychological ROS: negative  ENT ROS: negative  Allergy and Immunology ROS: negative  Hematological and Lymphatic ROS: negative  Respiratory ROS: no cough, shortness of breath, or wheezing  Cardiovascular ROS: no chest pain or dyspnea on exertion  Gastrointestinal ROS: no abdominal pain, change in bowel habits, or black or bloody stools  Musculoskeletal ROS: positive for - pain in hand - left  Neurological ROS: positive for - numbness/tingling  Dermatological ROS: negative    Past Medical History:   Diagnosis Date    IBS (irritable bowel syndrome)        Past Surgical History:   Procedure Laterality Date    HX  SECTION      HX LIPOSUCTION      HX TUBAL LIGATION      HX WRIST FRACTURE [place]             No Known Allergies        PE:     Physical Exam  Vitals signs and nursing note reviewed. Constitutional:       General: She is not in acute distress. Appearance: Normal appearance. She is not ill-appearing. Eyes:      Extraocular Movements: Extraocular movements intact. Pupils: Pupils are equal, round, and reactive to light. Neck:      Musculoskeletal: Normal range of motion and neck supple. Cardiovascular:      Pulses: Normal pulses. Pulmonary:      Effort: Pulmonary effort is normal. No respiratory distress. Abdominal:      General: Abdomen is flat. There is no distension. Musculoskeletal: Normal range of motion. General: No swelling, tenderness, deformity or signs of injury. Right lower leg: No edema. Left lower leg: No edema. Skin:     General: Skin is warm and dry. Capillary Refill: Capillary refill takes less than 2 seconds. Findings: No bruising or erythema. Neurological:      General: No focal deficit present. Mental Status: She is alert and oriented to person, place, and time. Cranial Nerves: No cranial nerve deficit. Sensory: No sensory deficit. Psychiatric:         Mood and Affect: Mood normal.         Behavior: Behavior normal.            Left hand: Incision clean dry and intact with no drainage breakdown erythema or any signs of infection. Range of motion of the digits is full. Sensation is intact distally and cap refill is brisk. Right hand:  There is no tenderness to palpation. Patient is full range of motion. Strength appears to be 5 out of 5. NCV & EMG Findings:  Evaluation of the left median motor nerve showed prolonged distal onset latency (4.8 ms). The left median sensory nerve showed prolonged distal peak latency (3.9 ms) and decreased conduction velocity (Wrist-2nd Digit, 36 m/s). All remaining nerves (as indicated in the following tables) were within normal limits.       All examined muscles (as indicated in the following table) showed no evidence of electrical instability.       INTERPRETATION  This is an abnormal electrodiagnostic examination. These findings may be consistent with:   1. Moderate median mononeuropathy at the left wrist (carpal tunnel syndrome)     There is no electrodiagnostic evidence of any cervical radiculopathy, brachial plexopathy, peripheral polyneuropathy, or any other mononeuropathy.     CLINICAL INTERPRETATION  Her electrodiagnostic finding of carpal tunnel syndrome may be consistent with her left arm symptoms. Imaging:     None indicated        ICD-10-CM ICD-9-CM    1. Left carpal tunnel syndrome  G56.02 354.0 REFERRAL TO OCCUPATIONAL THERAPY   2. Right carpal tunnel syndrome  G56.01 354.0 REFERRAL TO OCCUPATIONAL THERAPY   3. S/p bilateral carpal tunnel release  Z98.890 V45.89 REFERRAL TO OCCUPATIONAL THERAPY   4. Neck pain  M54.2 723.1 REFERRAL TO PHYSICIAL MEDICINE REHAB         Plan:     OT for range of motion exercises, edema control, and other modalities as well as treatment for her right hand fatigue. Referral to spine for neck pain and low back pain. Follow-up and Dispositions    · Return in about 4 weeks (around 3/8/2021) for Reevaluation and wound check.           Plan was reviewed with patient, who verbalized agreement and understanding of the plan

## 2021-03-08 ENCOUNTER — OFFICE VISIT (OUTPATIENT)
Dept: ORTHOPEDIC SURGERY | Age: 41
End: 2021-03-08
Payer: COMMERCIAL

## 2021-03-08 VITALS
RESPIRATION RATE: 16 BRPM | HEIGHT: 59 IN | TEMPERATURE: 97.6 F | HEART RATE: 77 BPM | WEIGHT: 138 LBS | DIASTOLIC BLOOD PRESSURE: 81 MMHG | BODY MASS INDEX: 27.82 KG/M2 | OXYGEN SATURATION: 100 % | SYSTOLIC BLOOD PRESSURE: 135 MMHG

## 2021-03-08 DIAGNOSIS — G56.02 LEFT CARPAL TUNNEL SYNDROME: Primary | ICD-10-CM

## 2021-03-08 DIAGNOSIS — G56.01 RIGHT CARPAL TUNNEL SYNDROME: ICD-10-CM

## 2021-03-08 DIAGNOSIS — Z98.890 S/P BILATERAL CARPAL TUNNEL RELEASE: ICD-10-CM

## 2021-03-08 PROCEDURE — 99024 POSTOP FOLLOW-UP VISIT: CPT | Performed by: ORTHOPAEDIC SURGERY

## 2021-03-08 NOTE — PROGRESS NOTES
Juan Be is a 36 y.o. female right handed Unknown employment. Worker's Compensation and legal considerations: not known. Vitals:    03/08/21 1351   BP: 135/81   Pulse: 77   Resp: 16   Temp: 97.6 °F (36.4 °C)   TempSrc: Temporal   SpO2: 100%   Weight: 138 lb (62.6 kg)   Height: 4' 11\" (1.499 m)   PainSc:   4   PainLoc: Hand           Chief Complaint   Patient presents with    Hand Pain     Post op on left hand       HPI: Patient presents today approximately 6 weeks status post left carpal tunnel release. She reports some continued soreness around the carpal tunnel incision. She has returned to work 3 days prior. She is also status post right carpal tunnel release previously without any issues. She is scheduled for OT to start soon. 2wk HPI: Patient presents today 2 weeks status post left carpal tunnel release. She reports minimal pain and no numbness on the left side. She does report some fatigue on the right side of which she is 2 to 3 years status post carpal tunnel release. She also reports a history of chronic neck and low back pain. Preop HPI: Patient presents today for preoperative history and physical as she is scheduled for a left carpal tunnel release. She denies any changes since her last visit. 12/9/2020 HPI: Patient comes in today for follow-up of her right upper extremity EMG. She previously had an injection for carpal tunnel that did help her symptoms however they are starting to return. She would like to set up surgery today. Initial HPI: Patient comes in today with complaints of left hand pain and numbness. She has a history of bilateral carpal tunnel syndrome with release of her carpal tunnel on the right. She thinks she may have had an EMG done in the past however she is not sure where or when this was.     Date of onset: Undetermined    Injury: No    Prior Treatment:  Yes: Comment: Bilateral carpal tunnel releases    Numbness/ Tingling: Yes: Comment: Left hand radial digits      ROS: Review of Systems - General ROS: negative  Psychological ROS: negative  ENT ROS: negative  Allergy and Immunology ROS: negative  Hematological and Lymphatic ROS: negative  Respiratory ROS: no cough, shortness of breath, or wheezing  Cardiovascular ROS: no chest pain or dyspnea on exertion  Gastrointestinal ROS: no abdominal pain, change in bowel habits, or black or bloody stools  Musculoskeletal ROS: positive for - pain in hand - left  Neurological ROS: positive for - numbness/tingling  Dermatological ROS: negative    Past Medical History:   Diagnosis Date    IBS (irritable bowel syndrome)        Past Surgical History:   Procedure Laterality Date    HX  SECTION      HX LIPOSUCTION      HX TUBAL LIGATION      HX WRIST FRACTURE [place]             No Known Allergies        PE:     Physical Exam  Vitals signs and nursing note reviewed. Constitutional:       General: She is not in acute distress. Appearance: Normal appearance. She is not ill-appearing. Eyes:      Extraocular Movements: Extraocular movements intact. Pupils: Pupils are equal, round, and reactive to light. Neck:      Musculoskeletal: Normal range of motion and neck supple. Cardiovascular:      Pulses: Normal pulses. Pulmonary:      Effort: Pulmonary effort is normal. No respiratory distress. Abdominal:      General: Abdomen is flat. There is no distension. Musculoskeletal: Normal range of motion. General: No swelling, tenderness, deformity or signs of injury. Right lower leg: No edema. Left lower leg: No edema. Skin:     General: Skin is warm and dry. Capillary Refill: Capillary refill takes less than 2 seconds. Findings: No bruising or erythema. Neurological:      General: No focal deficit present. Mental Status: She is alert and oriented to person, place, and time. Cranial Nerves: No cranial nerve deficit. Sensory: No sensory deficit.    Psychiatric: Mood and Affect: Mood normal.         Behavior: Behavior normal.            Left hand: Incision healed though hypertrophic. Range of motion full. Sensation intact distally and cap refill brisk. Minimal tenderness to palpation about the incision. Right hand: There is no tenderness to palpation. Patient is full range of motion. Strength appears to be 5 out of 5. NCV & EMG Findings:  Evaluation of the left median motor nerve showed prolonged distal onset latency (4.8 ms). The left median sensory nerve showed prolonged distal peak latency (3.9 ms) and decreased conduction velocity (Wrist-2nd Digit, 36 m/s). All remaining nerves (as indicated in the following tables) were within normal limits.       All examined muscles (as indicated in the following table) showed no evidence of electrical instability.       INTERPRETATION  This is an abnormal electrodiagnostic examination. These findings may be consistent with:   1. Moderate median mononeuropathy at the left wrist (carpal tunnel syndrome)     There is no electrodiagnostic evidence of any cervical radiculopathy, brachial plexopathy, peripheral polyneuropathy, or any other mononeuropathy.     CLINICAL INTERPRETATION  Her electrodiagnostic finding of carpal tunnel syndrome may be consistent with her left arm symptoms. Imaging:     None indicated        ICD-10-CM ICD-9-CM    1. Left carpal tunnel syndrome  G56.02 354.0    2. Right carpal tunnel syndrome  G56.01 354.0    3. S/p bilateral carpal tunnel release  Z98.890 V45.89          Plan:     Continue scar care and range of motion exercises. Follow-up and Dispositions    · Return in about 2 months (around 5/8/2021) for Reevaluation and OT follow-up.           Plan was reviewed with patient, who verbalized agreement and understanding of the plan

## (undated) DEVICE — BLADE OPHTH MINI BEAV SHRP --

## (undated) DEVICE — PADDING CAST COHESIVE 4 YDX3 IN HND TEARABLE COTTON SPEC 100

## (undated) DEVICE — SYR 10ML LUER LOK 1/5ML GRAD --

## (undated) DEVICE — SYR LR LCK 1ML GRAD NSAF 30ML --

## (undated) DEVICE — BANDAGE COMPR EXSANGUATION SGL LAYERED NO CLSR 9FT LEN 4IN W

## (undated) DEVICE — BIPOLAR FORCEPS CORD: Brand: VALLEYLAB

## (undated) DEVICE — DRAPE,REIN 53X77,STERILE: Brand: MEDLINE

## (undated) DEVICE — STERILE POLYISOPRENE POWDER-FREE SURGICAL GLOVES: Brand: PROTEXIS

## (undated) DEVICE — PACK PROCEDURE SURG MAJ W/ BASIN LF

## (undated) DEVICE — CURITY NON-ADHERENT STRIPS: Brand: CURITY

## (undated) DEVICE — GOWN,REINFORCE,POLY,SIRUS,SETSLV,XLARGE: Brand: MEDLINE

## (undated) DEVICE — BLANKET WRM AD W50XL85.8IN PACU FULL BODY FORC AIR

## (undated) DEVICE — GARMENT,MEDLINE,DVT,SEQUENTIAL,CALF,MD: Brand: MEDLINE

## (undated) DEVICE — INTENDED FOR TISSUE SEPARATION, AND OTHER PROCEDURES THAT REQUIRE A SHARP SURGICAL BLADE TO PUNCTURE OR CUT.: Brand: BARD-PARKER SAFETY BLADES SIZE 15, STERILE

## (undated) DEVICE — KIT CLN UP BON SECOURS MARYV

## (undated) DEVICE — DRAPE,HAND,STERILE: Brand: MEDLINE

## (undated) DEVICE — CANNULA PERF L2IN BLNT TIP 2MM VES CLR RADPQ BODY FEM LUER

## (undated) DEVICE — GAUZE,SPONGE,4"X4",12PLY,STERILE,LF,2'S: Brand: MEDLINE

## (undated) DEVICE — INTENDED FOR TISSUE SEPARATION, AND OTHER PROCEDURES THAT REQUIRE A SHARP SURGICAL BLADE TO PUNCTURE OR CUT.: Brand: BARD-PARKER ®  SAFETY SCALPED

## (undated) DEVICE — PREP SKN CHLRAPRP APL 26ML STR --

## (undated) DEVICE — BNDG CMPR ELAS KNT VEL STD 3IN -- MEDICHOICE